# Patient Record
Sex: FEMALE | Race: WHITE | NOT HISPANIC OR LATINO | URBAN - METROPOLITAN AREA
[De-identification: names, ages, dates, MRNs, and addresses within clinical notes are randomized per-mention and may not be internally consistent; named-entity substitution may affect disease eponyms.]

---

## 2020-02-04 PROBLEM — G40.909 SEIZURE DISORDER: Status: ACTIVE | Noted: 2020-02-04

## 2020-06-26 PROBLEM — R56.9 SEIZURES: Status: ACTIVE | Noted: 2020-06-26

## 2021-05-12 ENCOUNTER — PATIENT MESSAGE (OUTPATIENT)
Dept: RESEARCH | Facility: HOSPITAL | Age: 31
End: 2021-05-12

## 2023-03-09 PROBLEM — G43.009 MIGRAINE WITHOUT AURA AND WITHOUT STATUS MIGRAINOSUS, NOT INTRACTABLE: Status: ACTIVE | Noted: 2023-03-09

## 2023-06-28 PROBLEM — G47.01 INSOMNIA DUE TO MEDICAL CONDITION: Status: ACTIVE | Noted: 2023-06-28

## 2024-06-28 ENCOUNTER — NURSE TRIAGE (OUTPATIENT)
Dept: ADMINISTRATIVE | Facility: CLINIC | Age: 34
End: 2024-06-28

## 2024-06-28 ENCOUNTER — ON-DEMAND VIRTUAL (OUTPATIENT)
Dept: URGENT CARE | Facility: CLINIC | Age: 34
End: 2024-06-28

## 2024-06-28 DIAGNOSIS — G40.909 SEIZURE DISORDER: ICD-10-CM

## 2024-06-28 DIAGNOSIS — Z76.0 MEDICATION REFILL: Primary | ICD-10-CM

## 2024-06-28 RX ORDER — LAMOTRIGINE 300 MG/1
300 TABLET, EXTENDED RELEASE ORAL DAILY
Qty: 30 TABLET | Refills: 0 | Status: SHIPPED | OUTPATIENT
Start: 2024-06-28 | End: 2024-07-28

## 2024-06-28 NOTE — PROGRESS NOTES
Subjective:      Patient ID: Lina Rutledge is a 33 y.o. female.    Vitals:  vitals were not taken for this visit.     Chief Complaint: Medication Refill      Visit Type: TELE AUDIOVISUAL    Present with the patient at the time of consultation: TELEMED PRESENT WITH PATIENT: None, at home    Past Medical History:   Diagnosis Date    Seizures      Past Surgical History:   Procedure Laterality Date    APPENDECTOMY       SECTION      x 2    CHOLECYSTECTOMY      ENDOMETRIAL ABLATION      HYSTERECTOMY      SINUS SURGERY       Review of patient's allergies indicates:   Allergen Reactions    Cefprozil      Current Outpatient Medications on File Prior to Visit   Medication Sig Dispense Refill    diazePAM (VALTOCO) 15 mg/2 spray (7.5/0.1mL x 2) Spry SPRAY 1 SPRAY IN THE NOSE AS NEEDED SEIZURE 4 each 3    lacosamide (VIMPAT) 200 mg Tab tablet TAKE 1 TABLET(200 MG) BY MOUTH EVERY 12 HOURS 60 tablet 5    rizatriptan (MAXALT-MLT) 10 MG disintegrating tablet Take 1 tablet (10 mg total) by mouth as needed for Migraine. May repeat in 2 hours if needed. Do not take more than 30mg in 24 hours 12 tablet 6    traZODone (DESYREL) 50 MG tablet Take 1 tablet (50 mg total) by mouth nightly as needed for Insomnia. 30 tablet 6    [DISCONTINUED] lamotrigine XR (LAMICTAL XR) 300 mg XR tablet Take 1 tablet (300 mg total) by mouth once daily. 30 tablet 11     No current facility-administered medications on file prior to visit.     Family History   Problem Relation Name Age of Onset    Cancer Maternal Grandfather      Diabetes Paternal Grandmother         Medications Ordered                [id number] - [address] AT [facility] [address] [address]    Telephone: [phone]   Fax: [phone]   Hours: Not open 24 hours                         E-Prescribed (1 of 1)              lamotrigine XR (LAMICTAL XR) 300 mg XR tablet    Sig: Take 1 tablet (300 mg total) by mouth  once daily.       Start: 6/28/24     Quantity: 30 tablet Refills: 0                           [facility] Peq Odvv Intake    6/28/2024  6:44 PM CDT - Filed by Patient   What is your current physical address in the event of a medical emergency? [address] [address], [place]   Are you able to take your vital signs? No   Please attach any relevant images or files          Hx of SZ disorder. Out of Lamictal. Unable to get a hold of PCP for refill. No acute issues or concerns. Requesting refill only.    Medication Refill        Neurological:  Negative for seizures.        Objective:   The physical exam was conducted virtually.  Physical Exam   Constitutional: She is oriented to person, place, and time. She does not appear ill. No distress.   HENT:   Head: Normocephalic and atraumatic.   Nose: Nose normal.   Eyes: Extraocular movement intact   Pulmonary/Chest: Effort normal.   Abdominal: Normal appearance.   Musculoskeletal: Normal range of motion.         General: Normal range of motion.   Neurological: no focal deficit. She is alert and oriented to person, place, and time.   Psychiatric: Her behavior is normal. Mood normal.   Vitals reviewed.      Assessment:     1. Medication refill    2. Seizure disorder        Plan:   Follow-up with Primary care for future refills.    Patient encouraged to monitor symptoms closely and instructed to follow-up for new or worsening symptoms. Further, in-person, evaluation may be necessary for continued treatment. Please follow up with your primary care doctor or specialist as needed. Verbally discussed plan. Patient confirms understanding and is in agreement with treatment and plan.     You must understand that you've received a Virtual Care evaluation only and that you may be released before all your medical problems are known or treated. You, the patient, will arrange for follow up care as instructed.    Medication refill  -     lamotrigine XR (LAMICTAL XR) 300 mg XR tablet; Take 1 tablet (300  mg total) by mouth once daily.  Dispense: 30 tablet; Refill: 0    Seizure disorder  -     lamotrigine XR (LAMICTAL XR) 300 mg XR tablet; Take 1 tablet (300 mg total) by mouth once daily.  Dispense: 30 tablet; Refill: 0

## 2024-06-28 NOTE — TELEPHONE ENCOUNTER
Pt calling back about Lamictal rx. Pt left message last two days & just notified by answering service at neuro clinic for Dr. Costa that no one in clinic today. Pt is out & needs refill called in.     Per Dr. Mejia, on call-spoke with  & Dr. Costa can refill. Pt updated, advised in the event she does not receive update that rx sent over in next hr before clinic closes, alternative ODVV option available. Pt vu.     Reason for Disposition   Prescription refill request for ESSENTIAL medicine (i.e., likelihood of harm to patient if not taken) and triager unable to refill per department policy    Protocols used: Medication Refill and Renewal Call-A-OH

## 2024-06-28 NOTE — TELEPHONE ENCOUNTER
Reason for Disposition   Message left on identified voicemail    Protocols used: No Contact or Duplicate Contact Call-A-OH  Called patient twice and went to identified voicemail both times. Left 2 messages with phone number to call back. No contact made with patient by this nurse.

## 2024-06-28 NOTE — TELEPHONE ENCOUNTER
Patient called to request a refill of her prescription for Lamotrigine XR/Lamictal  mg, oral daily. Patient states she had her last dose on last evening, 24, and will need her dose for today. Patient is also requesting that her prescription be went to [facility] , [address] [place] [address]. Patient's prescription is  at this time and her follow up visit is scheduled for 24 @ 3:30 PM. Patient states she is asymptomatic.     Patient advised that a message will be sent to her ordering Provider, LICHA Montgomery for assistance with refill request. Secure Chat Message also sent to LICHA Montgomery. Patient also advised to contact the [facility] on Call Triage Service for any additional questions/symptoms. Patient states understanding of care advice.     Reason for Disposition   Prescription refill request for ESSENTIAL medicine (i.e., likelihood of harm to patient if not taken) and triager unable to refill per department policy    Additional Information   Negative: New-onset or worsening symptoms, see that protocol (e.g., diarrhea, runny nose, sore throat)   Negative: Medicine question not related to refill or renewal   Negative: Caller (e.g., patient or pharmacist) requesting information about a new medicine   Negative: Caller requesting information unrelated to medicine    Protocols used: Medication Refill and Renewal Call-A-OH

## 2024-07-29 ENCOUNTER — ON-DEMAND VIRTUAL (OUTPATIENT)
Dept: URGENT CARE | Facility: CLINIC | Age: 34
End: 2024-07-29
Payer: MEDICARE

## 2024-07-29 DIAGNOSIS — Z76.0 MEDICATION REFILL: Primary | ICD-10-CM

## 2024-07-29 PROCEDURE — 99499 UNLISTED E&M SERVICE: CPT | Mod: ,,,

## 2024-07-30 ENCOUNTER — OFFICE VISIT (OUTPATIENT)
Dept: URGENT CARE | Facility: CLINIC | Age: 34
End: 2024-07-30
Payer: MEDICARE

## 2024-07-30 ENCOUNTER — NURSE TRIAGE (OUTPATIENT)
Dept: ADMINISTRATIVE | Facility: CLINIC | Age: 34
End: 2024-07-30
Payer: MEDICARE

## 2024-07-30 VITALS
TEMPERATURE: 98 F | BODY MASS INDEX: 26.5 KG/M2 | OXYGEN SATURATION: 99 % | HEIGHT: 62 IN | DIASTOLIC BLOOD PRESSURE: 74 MMHG | RESPIRATION RATE: 18 BRPM | WEIGHT: 144 LBS | SYSTOLIC BLOOD PRESSURE: 122 MMHG | HEART RATE: 82 BPM

## 2024-07-30 DIAGNOSIS — Z76.0 ENCOUNTER FOR MEDICATION REFILL: Primary | ICD-10-CM

## 2024-07-30 PROCEDURE — 99213 OFFICE O/P EST LOW 20 MIN: CPT | Mod: S$GLB,,,

## 2024-07-30 RX ORDER — LAMOTRIGINE 100 MG/1
300 TABLET ORAL DAILY
Qty: 90 TABLET | Refills: 0 | Status: SHIPPED | OUTPATIENT
Start: 2024-07-30 | End: 2024-07-31

## 2024-07-30 NOTE — TELEPHONE ENCOUNTER
Pt has med refill issue. Trying to get vimpat and lamictal for seizures. Pt is completely out of lamictal, has a couple vimpat left. Spoke with md office today and advised they would not be able to refill meds as pt has not been seen in over a year. Pt states she has already used her one VV per year for obtaining a med refill.     Dispo- advised pt I would not be able to reach out to an OCP despite her meds being essential as she has already been told by provider that they cannot provide a refill due to her not being seen recently. Advised pt as response to her question about if UC would provide refill, that I am not certain but if she decides to go to UC she should [facility] facility that can review her meds in the case they are able to give her refills. She VU.   Reason for Disposition   Caller with prescription and triager answers question    Protocols used: Medication Refill and Renewal Call-A-AH

## 2024-07-30 NOTE — PROGRESS NOTES
"Subjective:      Patient ID: Lina Rutledge is a 33 y.o. female.    Vitals:  height is 5' 2" (1.575 m) and weight is 65.3 kg (144 lb). Her oral temperature is 98.2 °F (36.8 °C). Her blood pressure is 122/74 and her pulse is 82. Her respiration is 18 and oxygen saturation is 99%.     Chief Complaint: Medication Refill    32 yo female Patient is requesting a medication refill for seizures.  States she has been trying to reach her old provider for the last 2 months because she moved 5 hours away, and needed to use virtual visits. States she requested pharmacy change but was unable to get filled due to not having recent provider visit in person.  Denies symptoms today including headache, vision changes, recent seizures. Is dealing with coccyx fracture from recent injury which she is seeing a surgeon for by the end of next week. Patient states she has a new PCP lined up but earliest available appointment was in November, "and I can't be off my seizure medication for months." Allergic to cefprozil.     Medication Refill  This is a new problem. The current episode started today. The problem occurs constantly. The problem has been unchanged. Pertinent negatives include no abdominal pain, anorexia, arthralgias, change in bowel habit, chest pain, chills, congestion, coughing, diaphoresis, fatigue, fever, headaches, joint swelling, myalgias, nausea, neck pain, numbness, rash, sore throat, swollen glands, urinary symptoms, vertigo, visual change, vomiting or weakness. Nothing aggravates the symptoms. She has tried nothing for the symptoms. The treatment provided no relief.       Constitution: Negative for chills, sweating, fatigue and fever.   HENT:  Negative for congestion and sore throat.    Neck: Negative for neck pain.   Cardiovascular:  Negative for chest pain.   Eyes:  Negative for double vision and blurred vision.   Respiratory:  Negative for cough.    Gastrointestinal:  Negative for abdominal pain, nausea and vomiting. "   Musculoskeletal:  Negative for joint pain, joint swelling and muscle ache.   Skin:  Negative for rash.   Neurological:  Negative for history of vertigo, speech difficulty, headaches, numbness and seizures (controlled with medications).      Objective:     Vitals:    07/30/24 1839   BP: 122/74   Pulse: 82   Resp: 18   Temp: 98.2 °F (36.8 °C)       Physical Exam   Constitutional: She is oriented to person, place, and time. She appears well-developed.  Non-toxic appearance. She does not appear ill. No distress.   HENT:   Head: Normocephalic and atraumatic.   Ears:   Right Ear: Hearing and external ear normal.   Left Ear: Hearing, external ear and ear canal normal.   Nose: Nose normal. No mucosal edema, rhinorrhea or nasal deformity. No epistaxis. Right sinus exhibits no maxillary sinus tenderness and no frontal sinus tenderness. Left sinus exhibits no maxillary sinus tenderness and no frontal sinus tenderness.   Mouth/Throat: Uvula is midline, oropharynx is clear and moist and mucous membranes are normal. No trismus in the jaw. Normal dentition. No uvula swelling. No posterior oropharyngeal erythema.   Eyes: Conjunctivae, EOM and lids are normal. Pupils are equal, round, and reactive to light. Right eye exhibits no discharge. Left eye exhibits no discharge.   Neck: Trachea normal and phonation normal. Neck supple. No neck rigidity present.   Cardiovascular: Normal rate, regular rhythm, normal heart sounds and normal pulses.   Pulmonary/Chest: Effort normal and breath sounds normal. No stridor. No respiratory distress. She has no wheezes. She has no rhonchi. She has no rales.   Abdominal: Normal appearance.   Musculoskeletal: Normal range of motion.         General: No deformity. Normal range of motion.   Neurological: no focal deficit. She is alert and oriented to person, place, and time. She has intact cranial nerves (2-12). She displays facial symmetry. No cranial nerve deficit. She exhibits normal muscle tone.  Gait normal. Coordination and gait normal.   Skin: Skin is warm, dry, intact, not diaphoretic, not pale and no rash.   Psychiatric: Her speech is normal and behavior is normal. Judgment and thought content normal.   Nursing note and vitals reviewed.      Assessment:     1. Encounter for medication refill        Plan:       Encounter for medication refill  -     lamoTRIgine (LAMICTAL) 100 MG tablet; Take 3 tablets (300 mg total) by mouth once daily.  Dispense: 90 tablet; Refill: 0        Patient Instructions   Please take medications as directed    Follow up with your PCP as indicated for future refills     If you have any side effects, do not stop medication abruptly - you must tape down    Go to the ER if you have repeating seizure episodes, chest pain, shortness of breath, heart palpitations      - You must understand that you have received an Urgent Care treatment only and that you may be released before all of your medical problems are known or treated.   - You, the patient, will arrange for follow up care as instructed with your primary care provider or recommended specialist.   - If your condition worsens or fails to improve we recommend that you receive another evaluation at the ER immediately or contact your PCP to discuss your concerns, or return here.   - Please do not drive or make any important decisions for 24 hours if you have received any pain medications, sedatives or mood altering drugs during your visit.    Disclaimer: This document was drafted with the use of a voice recognition device and is likely to have sound alike errors.

## 2024-07-31 NOTE — PATIENT INSTRUCTIONS
Please take medications as directed    Follow up with your PCP as indicated for future refills     If you have any side effects, do not stop medication abruptly - you must tape down    Go to the ER if you have repeating seizure episodes, chest pain, shortness of breath, heart palpitations      - You must understand that you have received an Urgent Care treatment only and that you may be released before all of your medical problems are known or treated.   - You, the patient, will arrange for follow up care as instructed with your primary care provider or recommended specialist.   - If your condition worsens or fails to improve we recommend that you receive another evaluation at the ER immediately or contact your PCP to discuss your concerns, or return here.   - Please do not drive or make any important decisions for 24 hours if you have received any pain medications, sedatives or mood altering drugs during your visit.    Disclaimer: This document was drafted with the use of a voice recognition device and is likely to have sound alike errors.

## 2024-11-04 ENCOUNTER — TELEPHONE (OUTPATIENT)
Dept: NEUROLOGY | Facility: CLINIC | Age: 34
End: 2024-11-04
Payer: MEDICARE

## 2024-11-04 ENCOUNTER — LAB VISIT (OUTPATIENT)
Dept: LAB | Facility: HOSPITAL | Age: 34
End: 2024-11-04
Attending: STUDENT IN AN ORGANIZED HEALTH CARE EDUCATION/TRAINING PROGRAM
Payer: MEDICARE

## 2024-11-04 ENCOUNTER — OFFICE VISIT (OUTPATIENT)
Dept: NEUROLOGY | Facility: CLINIC | Age: 34
End: 2024-11-04
Payer: MEDICARE

## 2024-11-04 VITALS
WEIGHT: 141.13 LBS | BODY MASS INDEX: 25.81 KG/M2 | SYSTOLIC BLOOD PRESSURE: 119 MMHG | DIASTOLIC BLOOD PRESSURE: 77 MMHG | HEART RATE: 74 BPM

## 2024-11-04 DIAGNOSIS — G40.909 SEIZURE DISORDER: ICD-10-CM

## 2024-11-04 DIAGNOSIS — G40.219 PARTIAL SYMPTOMATIC EPILEPSY WITH COMPLEX PARTIAL SEIZURES, INTRACTABLE, WITHOUT STATUS EPILEPTICUS: ICD-10-CM

## 2024-11-04 DIAGNOSIS — G40.219 PARTIAL SYMPTOMATIC EPILEPSY WITH COMPLEX PARTIAL SEIZURES, INTRACTABLE, WITHOUT STATUS EPILEPTICUS: Primary | ICD-10-CM

## 2024-11-04 DIAGNOSIS — G43.009 MIGRAINE WITHOUT AURA AND WITHOUT STATUS MIGRAINOSUS, NOT INTRACTABLE: ICD-10-CM

## 2024-11-04 DIAGNOSIS — R56.9 CONVULSIONS, UNSPECIFIED CONVULSION TYPE: ICD-10-CM

## 2024-11-04 PROCEDURE — 80175 DRUG SCREEN QUAN LAMOTRIGINE: CPT | Performed by: STUDENT IN AN ORGANIZED HEALTH CARE EDUCATION/TRAINING PROGRAM

## 2024-11-04 PROCEDURE — 99999 PR PBB SHADOW E&M-EST. PATIENT-LVL III: CPT | Mod: PBBFAC,,, | Performed by: STUDENT IN AN ORGANIZED HEALTH CARE EDUCATION/TRAINING PROGRAM

## 2024-11-04 PROCEDURE — 36415 COLL VENOUS BLD VENIPUNCTURE: CPT | Performed by: STUDENT IN AN ORGANIZED HEALTH CARE EDUCATION/TRAINING PROGRAM

## 2024-11-04 PROCEDURE — 99213 OFFICE O/P EST LOW 20 MIN: CPT | Mod: PBBFAC | Performed by: STUDENT IN AN ORGANIZED HEALTH CARE EDUCATION/TRAINING PROGRAM

## 2024-11-04 RX ORDER — RIZATRIPTAN BENZOATE 5 MG/1
5 TABLET, ORALLY DISINTEGRATING ORAL
Qty: 30 TABLET | Refills: 2 | Status: SHIPPED | OUTPATIENT
Start: 2024-11-04 | End: 2024-12-04

## 2024-11-04 NOTE — TELEPHONE ENCOUNTER
EMU Scheduled 12/6/24, 11am. Aware an adult is required to accompany her for the duration including overnight. Aware she will be connected to lines to her head, chest, arm, have an IV placed; will not be able to leave the room until all equipment is removed at discharge. Instructions thoroughly discussed; mailed via [employer]. Denies any special needs.

## 2024-11-04 NOTE — PROGRESS NOTES
"[facility]  Clinic Note    Date of Service: 11/4/2024  Patient seen at the request of: No ref. provider found    Reason for Consultation  epilepsy    Assessment:  Lina Rutledge is a 34 y.o. 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  Patient presents with very frequent focal seizures that have been associated with progression to convulsions.      At least three different seizure semiologies noted.  For semiology is reported to be a visual strobing or sometimes squiggles in her vision.  These symptoms are sometimes preceded by a loud auditory aura.  Sometimes progress to convulsion.    Patient also describes an isolated burning smell and a third phenomenon in which she "crumbles to the ground".  Once, this third semiology led to an injury as she fell down the stairs.  Query as to whether it is not true atonia as the patient does not always injure herself during these events.    Seizures are usually associated with severe headache after their offset.    MRI brain in the past was significant for right mesial temporal sclerosis.  We will proceed with the surgical workup as the patient is drug-resistant.    Current medications:    - Lamictal 300mg ER   - Vimpat 200/200  Past medications:    - Fycompa (dizziness, agitated)   - Keppra (agitated)   - Depakote (not efficacious)    Surgical options were discussed in clinic today and patient +  are in agreement with pursuing pre-surgical workup.      Plan:    - Lamictal and Vimpat levels  - continue lamictal 300mg ER  - Vimpat 200/200  - Maxalt 5mg as needed for headaches after seizures    - neuropsychological testing  - PET  - fMRI  - routine EEG   - EMU phase I      - RTC in 2 months    SEIZURE/EVENT PRECAUTIONS INCLUDE:  NO DRIVING until approximately 3-6 months have passed " "WITHOUT a seizure.   If you have a seizure, you will need to wait another 3 months to be eligible to drive again.  Avoid bathing or swimming alone or unsupervised.  Avoid cooking over large ranges or open flames.    Avoid climbing up heights unsupervised.  Avoid operating heavy machinery.     SEIZURE SAFETY:  When an epileptic seizure occurs, the following should be done to prevent injuries:  Do not hold or tie the person down.  Do not place anything in the person's mouth or try to force the teeth apart. The person is not in danger of swallowing his tongue.  Do not pour any liquid into the persons mouth or offer food or medicines until he is fully awake.  If possible, turn the person on his side during the seizure.  Place something soft under the person's head, loosen tight clothing, and clear the area of sharp or hard objects.  Stay with the person until the seizure ends. Let the person rest until he is fully awake.  Use a watch to time how long the seizure lasts.   Watch the type of movement and position of the person's head or eyes during the seizure.      A dictation device was used to produce this document. Use of such devices sometimes results in grammatical errors or replacement of words that sound similarly.     Signed:    Prashanth Elam MD  Neurology/Epilepsy  11/04/2024 7:53 AM      HPI:  Lina Rutledge is a 34 y.o. female with   Past Medical History:   Diagnosis Date    Seizures      MRI shows right MTS.    Patient reports one EMU visit in [place] with no seizure captured.      Significant decline in short term memory noted over the last 6 years.    First seizure: 6 months old    Semiology:   1st type:   Per mother she turns her head and stares off. Her right hand starts tapping. Sometimes she starts tapping just before she starts staring off. Sees strobe type lights and "squiggles" in her vision prior to and after the event. Can progress to convulsion.  - Aura: Hearing a buzzing or extremely loud " "train noise  - Triggers: car headlights at night  - Frequency: twice per month  - Duration: ~30 seconds    2nd type:   electrical burning smell  - Triggers: car headlights at night  - Frequency: once per month  - Duration: ~30 seconds    3rd type:   Crumbles to the ground  Has been associated with injury (tailbone fracture)  - Frequency: once per week    Status epilepticus: none    Seizure Risk Factors:   Birth history: 37 weeks   Developmental history: none  Febrile seizure: yes at 6 months   CNS infection: none  Head trauma: none  Family history: none    Previous Anti-Epileptic Medication:    Current medications:    - Lamictal 300mg ER   - Vimpat 200/200  Past medications:    - Fycompa (dizziness, agitated)   - Keppra (agitated)   - Depakote (not efficacious)  Driving: none      This is the extent of the patient's complaints at this time.    No results found for: "TSH", "V12", "JNBFSMIZ16", "HGBA1C"      Review of Systems:  ROS negative unless noted in HPI    Past Surgical History:  Past Surgical History:   Procedure Laterality Date    APPENDECTOMY       SECTION      x 2    CHOLECYSTECTOMY      ENDOMETRIAL ABLATION      HYSTERECTOMY      SINUS SURGERY         Family History:  Family History   Problem Relation Name Age of Onset    Cancer Maternal Grandfather      Diabetes Paternal Grandmother         Social History:  Social History     Tobacco Use    Smoking status: Every Day     Current packs/day: 0.25     Types: Cigarettes    Smokeless tobacco: Current   Substance Use Topics    Alcohol use: Not Currently    Drug use: Never       Allergies:  Cefprozil    Outpatient Medications:  Prior to Admission medications    Medication Sig Start Date End Date Taking? Authorizing Provider   diazePAM (VALTOCO) 15 mg/2 spray (7.5/0.1mL x 2) Spry SPRAY 1 SPRAY IN THE NOSE AS NEEDED SEIZURE 23   Joyce Costa APRN   lacosamide (VIMPAT) 200 mg Tab tablet Take 1 tablet (200 mg total) by mouth every 12 (twelve) hours. " 7/31/24   Joyce Costa APRN   lamotrigine XR (LAMICTAL XR) 300 mg XR tablet Take 1 tablet (300 mg total) by mouth once daily. 7/31/24 2/26/25  Julissa LICHA Cook       Physical exam:    Vitals: /77 (BP Location: Left arm, Patient Position: Sitting)   Pulse 74   Wt 64 kg (141 lb 1.5 oz)   LMP  (LMP Unknown)   BMI 25.81 kg/m²     General:   Sitting in chair, in no distress, well-nourished, well-developed, appears stated age.  Head/Neck:   Normocephalic,atraumatic  Pulm:  Non-labored breathing     Mental Status: Alert and oriented to person, time, place, situation. Speech spontaneous and fluent without paraphasias; no dysarthria  CN:  II: visual fields full  III, IV, VI: EOM intact without nystagmus or diplopia.   V: Sensation to light touch full and symmetric in V1-3. Masseter contraction full bilaterally.   VII: Facial movement full and symmetric.   VIII: Hearing grossly normal to conversation.  IX, X: Palate midline with symmetric elevation.    XI: SCM and trapezius: 5/5 bilaterally.   XII: Tongue midline without fasciculations.  Motor: Normal bulk and tone throughout all four extremities.   LUE: D: 5/5; B: 5/5; T:  5/5; WF: 5/5; WE:  5/5; IO: 5/5   RUE: D: 5/5; B: 5/5; T:  5/5; WF:5/5; WE:  5/5; IO: 5/5   LLE: HF: 5/5, KE: 5/5, KF: 5/5, DF: 5/5, PF: 5/5  RLE: HF: 5/5, KE: 5/5, KF: 5/5, DF: 5/5, PF: 5/5  No tremors   Sensory: Intact and symmetric to light touch throughout.  Reflexes: LUE: Biceps 2+ brachioradialis 2+  RUE: Biceps 2+, brachioradialis 2+  LLE: Knee 2+, ankle 2+  RLE: Knee 2+, ankle 2+  Coordination:  Intact and symmetric to finger-to-nose and heel-to-shin.  Gait:  Intact to casual gait.    Imaging:  All pertinent imaging was personally reviewed.    On my personal review:   Agree with MRI brain read of right MTS      Results for orders placed during the hospital encounter of 05/28/20    MRI Brain W WO Contrast    Narrative  EXAMINATION:  MRI BRAIN W WO CONTRAST    CLINICAL  HISTORY:  Seizures new or progressive;. Unspecified convulsions    COMPARISON:  None.    FINDINGS:  No mass, mass effect or hemorrhage can be seen in the brain.  Decreased volume of the right hippocampus.  Increased intensity in FLAIR sequence in the right CNA.  Mild dilation of the few jaspreet horn of the right lateral ventricle.    Impression  Findings mostly consistent with right mesial temporal sclerosis with decreased volume of the CNA, loss of anatomical features and hyperintensity in FLAIR sequence indicating sclerotic changes.      Electronically signed by: Robert Whitmore  Date:    05/28/2020  Time:    13:07      I spent a total of 58 minutes on the day of the visit. This includes face to face time and non-face to face time preparing to see the patient (eg, review of tests), obtaining and/or reviewing separately obtained history, documenting clinical information in the electronic or other health record, independently interpreting results and communicating results to the patient/family/caregiver, or care coordinator.

## 2024-11-06 LAB — LACOSAMIDE: 4.4 MCG/ML (ref 1–10)

## 2024-11-07 LAB — LAMOTRIGINE SERPL-MCNC: 6.5 UG/ML (ref 2–15)

## 2024-11-19 ENCOUNTER — TELEPHONE (OUTPATIENT)
Dept: NEUROLOGY | Facility: CLINIC | Age: 34
End: 2024-11-19
Payer: MEDICARE

## 2024-11-20 ENCOUNTER — PATIENT MESSAGE (OUTPATIENT)
Dept: NEUROLOGY | Facility: CLINIC | Age: 34
End: 2024-11-20
Payer: MEDICARE

## 2024-11-26 ENCOUNTER — HOSPITAL ENCOUNTER (OUTPATIENT)
Dept: RADIOLOGY | Facility: HOSPITAL | Age: 34
Discharge: HOME | End: 2024-11-26
Attending: STUDENT IN AN ORGANIZED HEALTH CARE EDUCATION/TRAINING PROGRAM
Payer: MEDICARE

## 2024-11-26 DIAGNOSIS — G40.219 PARTIAL SYMPTOMATIC EPILEPSY WITH COMPLEX PARTIAL SEIZURES, INTRACTABLE, WITHOUT STATUS EPILEPTICUS: ICD-10-CM

## 2024-11-26 PROCEDURE — 96020 FUNCTIONAL BRAIN MAPPING: CPT | Mod: TC

## 2024-12-05 ENCOUNTER — HOSPITAL ENCOUNTER (OUTPATIENT)
Dept: NEUROLOGY | Facility: CLINIC | Age: 34
Discharge: HOME | End: 2024-12-05
Payer: MEDICARE

## 2024-12-05 DIAGNOSIS — G40.219 PARTIAL SYMPTOMATIC EPILEPSY WITH COMPLEX PARTIAL SEIZURES, INTRACTABLE, WITHOUT STATUS EPILEPTICUS: ICD-10-CM

## 2024-12-05 PROCEDURE — 95819 EEG AWAKE AND ASLEEP: CPT | Mod: PBBFAC | Performed by: PSYCHIATRY & NEUROLOGY

## 2024-12-05 NOTE — PROCEDURES
Date of service  12/05/2024    Introduction  Electroencephalographic (EEG) recording is performed with electrodes placed according to the International 10-20 placement system. Thirty two (32) channels of digital signal (sampling rate of 512/sec) including T1 and T2 was simultaneously recorded from the scalp and may include EKG, EMG, and/or eye monitors. Recording band pass was 0.1 to 512 Hz. Digital video recording of the patient is simultaneously recorded with the EEG. The patient is instructed to report clinical symptoms which may occur during the recording session. EEG and video recording is stored and archived in digital format. Activation procedures which include photic stimulation, hyperventilation and instructing patients to perform simple tasks are done in selected patients.    The EEG is displayed on a monitor screen and can be reviewed using different montages. Computer assisted analysis is employed to detect spike and electrographic seizure activity. The entire record is submitted for computer analysis. The entire recording is visually reviewed and, the times identified by computer analysis as being spikes or seizures are reviewed again.     Compressed spectral analysis (CSA) is also performed on the activity recorded from each individual channel. This is displayed as a power display of frequencies from 0 to 30 Hz over time. The CSA is reviewed looking for asymmetries in power between homologous areas of the scalp and then compared with the original EEG recording.     Findings  The patient's background consists of a posteriorly dominant 9 Hz alpha rhythm, which is well-formed, well-modulated, and abolishes with eye opening.  Anteriorly, the patient's background consists of predominantly beta range frequencies.    During the course of the recording, the patient is noted to be awake, and subsequently becomes drowsy.  Patient then fell asleep with normal, symmetry stage II sleep architecture seen.      Hyperventilation and photic stimulation did not produce any abnormal response.    There is no focal slowing.  There are no focal, lateralized, or epileptiform transients.  No electrographic seizures are seen.    EKG demonstrates regular rhythm.    Interpretation  This is a normal EEG during wakefulness and sleep. No potentially epileptiform activity was seen. Please be aware that a normal EEG does not exclude the possibility of an underlying seizure disorder.

## 2024-12-06 ENCOUNTER — HOSPITAL ENCOUNTER (INPATIENT)
Facility: HOSPITAL | Age: 34
LOS: 5 days | Discharge: HOME | DRG: 101 | End: 2024-12-11
Attending: STUDENT IN AN ORGANIZED HEALTH CARE EDUCATION/TRAINING PROGRAM | Admitting: STUDENT IN AN ORGANIZED HEALTH CARE EDUCATION/TRAINING PROGRAM
Payer: MEDICARE

## 2024-12-06 ENCOUNTER — HOSPITAL ENCOUNTER (OUTPATIENT)
Dept: RADIOLOGY | Facility: HOSPITAL | Age: 34
Discharge: HOME | End: 2024-12-06
Attending: STUDENT IN AN ORGANIZED HEALTH CARE EDUCATION/TRAINING PROGRAM
Payer: MEDICARE

## 2024-12-06 DIAGNOSIS — G40.919 REFRACTORY EPILEPSY: Primary | ICD-10-CM

## 2024-12-06 DIAGNOSIS — R56.9 SEIZURE-LIKE ACTIVITY: ICD-10-CM

## 2024-12-06 DIAGNOSIS — G40.219 PARTIAL SYMPTOMATIC EPILEPSY WITH COMPLEX PARTIAL SEIZURES, INTRACTABLE, WITHOUT STATUS EPILEPTICUS: ICD-10-CM

## 2024-12-06 DIAGNOSIS — R56.9 SEIZURE: ICD-10-CM

## 2024-12-06 LAB
ALBUMIN SERPL BCP-MCNC: 4.4 G/DL (ref 3.5–5.2)
ALP SERPL-CCNC: 79 U/L (ref 40–150)
ALT SERPL W/O P-5'-P-CCNC: 32 U/L (ref 10–44)
AMPHET+METHAMPHET UR QL: NEGATIVE
ANION GAP SERPL CALC-SCNC: 10 MMOL/L (ref 8–16)
AST SERPL-CCNC: 22 U/L (ref 10–40)
B-HCG UR QL: NEGATIVE
BARBITURATES UR QL SCN>200 NG/ML: NEGATIVE
BASOPHILS # BLD AUTO: 0.04 K/UL (ref 0–0.2)
BASOPHILS NFR BLD: 0.4 % (ref 0–1.9)
BENZODIAZ UR QL SCN>200 NG/ML: NEGATIVE
BILIRUB SERPL-MCNC: 0.5 MG/DL (ref 0.1–1)
BUN SERPL-MCNC: 15 MG/DL (ref 6–20)
BZE UR QL SCN: NEGATIVE
CALCIUM SERPL-MCNC: 9.6 MG/DL (ref 8.7–10.5)
CANNABINOIDS UR QL SCN: NEGATIVE
CHLORIDE SERPL-SCNC: 106 MMOL/L (ref 95–110)
CO2 SERPL-SCNC: 25 MMOL/L (ref 23–29)
CREAT SERPL-MCNC: 0.9 MG/DL (ref 0.5–1.4)
CREAT UR-MCNC: 90 MG/DL (ref 15–325)
DIFFERENTIAL METHOD BLD: ABNORMAL
EOSINOPHIL # BLD AUTO: 0.1 K/UL (ref 0–0.5)
EOSINOPHIL NFR BLD: 1.3 % (ref 0–8)
ERYTHROCYTE [DISTWIDTH] IN BLOOD BY AUTOMATED COUNT: 12 % (ref 11.5–14.5)
EST. GFR  (NO RACE VARIABLE): >60 ML/MIN/1.73 M^2
GLUCOSE SERPL-MCNC: 91 MG/DL (ref 70–110)
HCT VFR BLD AUTO: 40.4 % (ref 37–48.5)
HGB BLD-MCNC: 14.4 G/DL (ref 12–16)
IMM GRANULOCYTES # BLD AUTO: 0.02 K/UL (ref 0–0.04)
IMM GRANULOCYTES NFR BLD AUTO: 0.2 % (ref 0–0.5)
LYMPHOCYTES # BLD AUTO: 2.4 K/UL (ref 1–4.8)
LYMPHOCYTES NFR BLD: 24.7 % (ref 18–48)
MCH RBC QN AUTO: 33.3 PG (ref 27–31)
MCHC RBC AUTO-ENTMCNC: 35.6 G/DL (ref 32–36)
MCV RBC AUTO: 93 FL (ref 82–98)
METHADONE UR QL SCN>300 NG/ML: NEGATIVE
MONOCYTES # BLD AUTO: 0.7 K/UL (ref 0.3–1)
MONOCYTES NFR BLD: 6.9 % (ref 4–15)
NEUTROPHILS # BLD AUTO: 6.6 K/UL (ref 1.8–7.7)
NEUTROPHILS NFR BLD: 66.5 % (ref 38–73)
NRBC BLD-RTO: 0 /100 WBC
OPIATES UR QL SCN: NEGATIVE
PCP UR QL SCN>25 NG/ML: NEGATIVE
PLATELET # BLD AUTO: 218 K/UL (ref 150–450)
PMV BLD AUTO: 10.8 FL (ref 9.2–12.9)
POCT GLUCOSE: 91 MG/DL (ref 70–110)
POTASSIUM SERPL-SCNC: 3.9 MMOL/L (ref 3.5–5.1)
PROT SERPL-MCNC: 7 G/DL (ref 6–8.4)
RBC # BLD AUTO: 4.33 M/UL (ref 4–5.4)
SODIUM SERPL-SCNC: 141 MMOL/L (ref 136–145)
TOXICOLOGY INFORMATION: NORMAL
WBC # BLD AUTO: 9.89 K/UL (ref 3.9–12.7)

## 2024-12-06 PROCEDURE — 78608 BRAIN IMAGING (PET): CPT | Mod: TC

## 2024-12-06 PROCEDURE — 80053 COMPREHEN METABOLIC PANEL: CPT

## 2024-12-06 PROCEDURE — 80175 DRUG SCREEN QUAN LAMOTRIGINE: CPT

## 2024-12-06 PROCEDURE — A9552 F18 FDG: HCPCS | Performed by: STUDENT IN AN ORGANIZED HEALTH CARE EDUCATION/TRAINING PROGRAM

## 2024-12-06 PROCEDURE — 95714 VEEG EA 12-26 HR UNMNTR: CPT

## 2024-12-06 PROCEDURE — 85025 COMPLETE CBC W/AUTO DIFF WBC: CPT

## 2024-12-06 PROCEDURE — 36415 COLL VENOUS BLD VENIPUNCTURE: CPT

## 2024-12-06 PROCEDURE — 95720 EEG PHY/QHP EA INCR W/VEEG: CPT | Mod: ,,, | Performed by: PSYCHIATRY & NEUROLOGY

## 2024-12-06 PROCEDURE — A4216 STERILE WATER/SALINE, 10 ML: HCPCS

## 2024-12-06 PROCEDURE — 11000001 HC ACUTE MED/SURG PRIVATE ROOM

## 2024-12-06 PROCEDURE — 81025 URINE PREGNANCY TEST: CPT

## 2024-12-06 PROCEDURE — 80307 DRUG TEST PRSMV CHEM ANLYZR: CPT

## 2024-12-06 PROCEDURE — 80235 DRUG ASSAY LACOSAMIDE: CPT

## 2024-12-06 PROCEDURE — 99223 1ST HOSP IP/OBS HIGH 75: CPT | Mod: AI,,, | Performed by: STUDENT IN AN ORGANIZED HEALTH CARE EDUCATION/TRAINING PROGRAM

## 2024-12-06 PROCEDURE — 25000003 PHARM REV CODE 250

## 2024-12-06 RX ORDER — LORAZEPAM 2 MG/ML
2 INJECTION INTRAMUSCULAR EVERY 5 MIN PRN
Status: DISCONTINUED | OUTPATIENT
Start: 2024-12-06 | End: 2024-12-11 | Stop reason: HOSPADM

## 2024-12-06 RX ORDER — FLUDEOXYGLUCOSE F18 500 MCI/ML
9 INJECTION INTRAVENOUS
Status: COMPLETED | OUTPATIENT
Start: 2024-12-06 | End: 2024-12-06

## 2024-12-06 RX ORDER — SODIUM CHLORIDE 0.9 % (FLUSH) 0.9 %
10 SYRINGE (ML) INJECTION
Status: DISCONTINUED | OUTPATIENT
Start: 2024-12-06 | End: 2024-12-11 | Stop reason: HOSPADM

## 2024-12-06 RX ORDER — DOCUSATE SODIUM 100 MG/1
100 CAPSULE, LIQUID FILLED ORAL 2 TIMES DAILY PRN
Status: DISCONTINUED | OUTPATIENT
Start: 2024-12-06 | End: 2024-12-11 | Stop reason: HOSPADM

## 2024-12-06 RX ORDER — ACETAMINOPHEN 325 MG/1
650 TABLET ORAL EVERY 4 HOURS PRN
Status: DISCONTINUED | OUTPATIENT
Start: 2024-12-06 | End: 2024-12-11 | Stop reason: HOSPADM

## 2024-12-06 RX ORDER — LACOSAMIDE 100 MG/1
100 TABLET ORAL EVERY 12 HOURS
Status: DISCONTINUED | OUTPATIENT
Start: 2024-12-06 | End: 2024-12-07

## 2024-12-06 RX ADMIN — FLUDEOXYGLUCOSE F-18 8.6 MILLICURIE: 500 INJECTION INTRAVENOUS at 08:12

## 2024-12-06 RX ADMIN — LACOSAMIDE 100 MG: 100 TABLET, FILM COATED ORAL at 08:12

## 2024-12-06 RX ADMIN — Medication 10 ML: at 08:12

## 2024-12-06 NOTE — H&P
[facility] - Neurosurgery ([facility])  Neurology-Epilepsy  History & Physical    Patient Name: Lina Rutledge  MRN: [id number]   Admission Date: 12/6/2024  Code Status: Full Code   Attending Provider: Claudine Toledo*   Primary Care Physician: Dianelys, Primary Doctor  Principal Problem:Refractory epilepsy    Subjective:     Chief Complaint:  EMU     HPI:   Ms. Lina Rutledge is a 34 y.o. female with refractory epilepsy who was referred by Dr. Elam for event capture/characterization and surgical planning.     Seizure Type: unknown  Seizure Etiology: unknown - suspect R MTS  Home AEDs: Vimpat 200 mg BID, Lamictal  mg po qhs  Last AEDs Taken Prior to Admission: 12/5/2024 PM    The patient is accompanied by family who contribute to the history. This patient has three types of seizure as described below. The patient reports having seizures for years. The patient reports to have worse seizure control. The seizure frequency is variable. The last seizure was Tuesday evening (12/3/2024) . The patient does not report side effects from seizure medication.     Semiology:   1st type: tapping/picking her right hand then stares off; can progress to GTC  - Aura: Hearing a buzzing or extremely loud train noise; squiggles in vision; rising in stomach  - Triggers: car headlights at night  - Frequency: variable, increased in last few months  - Duration: ~30 seconds     2nd type:   electrical burning smell  - Triggers: car headlights at night  - Frequency: once per month  - Duration: ~30 seconds    Handedness: right  Seizure Onset Age: childhood  Seizure/ Epilepsy Risk Factors: childhood seizure  Birth/Developmental History: normal birth history and Normal developmental history  Seizure Triggers/ Provoking Features: stress and alcohol, abrupt temperature changes  Previous Seizure Medications: levetiracetam (Keppra, LEV), perampanel (Fycompa, FCP), and valproic acid (Depakote, VPA)  Recent Med Changes: None  Other Treatments:  None  Prior Episodes of Status: No  Psychiatric/Behavioral Comorbitidies: None documented  Surgical Candidacy: Undergoing eval    Prior Studies:  EEG :   3/27/2020 - This is a normal EEG for age in the awake and drowsy states.   2024 - This is a normal EEG during wakefulness and sleep. No potentially epileptiform activity was seen.   vEEG/ EMU evaluation:   2020-2020 in [place] - The patient had two events during last two nights described as a nightmare, followed by awakening with SOB (typical semiology). The patient was also sleep deprived for one night to exacerbate epileptiform activity. The EEG reports from the first event revealed no ictal events, epileptiform discharges or paroxysmal abnormalities.   2020-2020 in [place] -  No abnormal electrical activity was noted on EEG despite weaning lamotrigine to 100 mg once and one night of sleep deprivation.   MRI of brain:   2020 MRI w/wo - Findings mostly consistent with right mesial temporal sclerosis with decreased volume of the CNA, loss of anatomical features and hyperintensity in FLAIR sequence indicating sclerotic changes   2024 fMRI - Findings compatible with left hemisphere language dominance with frontotemporal concordance.   AED levels:  2024 lacosamide level 4.4; lamotrigine level 6.5  CT/CTA Scan:  None  PET Scan: 2024 - Minimal asymmetric hypometabolism within the right hippocampal region as compared to the left.   Neuropsychological Evaluation: None  DEXA Scan: None  Other studies: None    Past Medical History:   Diagnosis Date    Seizures        Past Surgical History:   Procedure Laterality Date    APPENDECTOMY       SECTION      x 2    CHOLECYSTECTOMY      ENDOMETRIAL ABLATION      HYSTERECTOMY      SINUS SURGERY         Review of patient's allergies indicates:   Allergen Reactions    Cefprozil        No current facility-administered medications on file prior to encounter.     Current  Outpatient Medications on File Prior to Encounter   Medication Sig    diazePAM (VALTOCO) 15 mg/2 spray (7.5/0.1mL x 2) Spry SPRAY 1 SPRAY IN THE NOSE AS NEEDED SEIZURE    lacosamide (VIMPAT) 200 mg Tab tablet Take 1 tablet (200 mg total) by mouth every 12 (twelve) hours.    lamotrigine XR (LAMICTAL XR) 300 mg XR tablet Take 1 tablet (300 mg total) by mouth once daily.    rizatriptan (MAXALT-MLT) 5 MG disintegrating tablet Take 1 tablet (5 mg total) by mouth as needed for Migraine. May repeat in 2 hours if needed     Continuous Infusions:    Family History       Problem Relation (Age of Onset)    Cancer Maternal Grandfather    Diabetes Paternal Grandmother          Tobacco Use    Smoking status: Every Day     Current packs/day: 0.25     Types: Cigarettes    Smokeless tobacco: Current   Substance and Sexual Activity    Alcohol use: Not Currently    Drug use: Never    Sexual activity: Not Currently     Review of Systems   Neurological:  Positive for seizures (last one on 12/3).     Objective:     Vital Signs (Most Recent):  Temp: 97.8 °F (36.6 °C) (12/06/24 1126)  Pulse: 80 (12/06/24 1126)  Resp: 18 (12/06/24 1126)  BP: (!) 115/53 (12/06/24 1126)  SpO2: 99 % (12/06/24 1126) Vital Signs (24h Range):  Temp:  [97.8 °F (36.6 °C)] 97.8 °F (36.6 °C)  Pulse:  [80] 80  Resp:  [18] 18  SpO2:  [99 %] 99 %  BP: (115)/(53) 115/53        There is no height or weight on file to calculate BMI.     Physical Exam  Vitals and nursing note reviewed.   Constitutional:       Appearance: Normal appearance. She is normal weight.   HENT:      Head: Normocephalic and atraumatic.   Pulmonary:      Effort: Pulmonary effort is normal. No respiratory distress.   Musculoskeletal:      Right lower leg: No edema.      Left lower leg: No edema.   Skin:     General: Skin is warm and dry.   Neurological:      General: No focal deficit present.      Mental Status: She is alert and oriented to person, place, and time. Mental status is at baseline.       "GCS: GCS eye subscore is 4. GCS verbal subscore is 5. GCS motor subscore is 6.      Cranial Nerves: Cranial nerves 2-12 are intact.      Sensory: Sensation is intact.      Motor: Motor function is intact.      Deep Tendon Reflexes:      Reflex Scores:       Bicep reflexes are 2+ on the right side and 2+ on the left side.       Patellar reflexes are 2+ on the right side and 2+ on the left side.           NEUROLOGICAL EXAMINATION:     MENTAL STATUS   Oriented to person, place, and time.     CRANIAL NERVES   Cranial nerves II through XII intact.     REFLEXES     Reflexes   Right biceps: 2+  Left biceps: 2+  Right patellar: 2+  Left patellar: 2+      Significant Labs: All pertinent lab results from the past 24 hours have been reviewed.    Significant Studies: I have reviewed and interpreted all pertinent imaging results/findings within the past 24 hours.    Assessment and Plan:     * Refractory epilepsy  Mesial temporal sclerosis     34 y.o. female with refractory epilepsy who was referred by Dr. Elam for event capture/characterization and surgical planning. Increased seizure frequency since June 2024. Typical seizure - aura with hearing a buzzing or extremely loud train noise, squiggles in vision, rising in stomach followed by "staring," occasionally progresses to GTC. Home meds Vimpat 200 mg BID and Lamictal  mg po qhs.     - Continuous vEEG   - Hold home Lamictal; decrease Vimpat to 100 mg BID  - AED levels  - Activation procedures per protocol- medication adjustments as above  - IV Ativan PRN for GTC greater than 5 min - please call epilepsy on call   - Seizure precautions, suction and oxygen at bedside  - Fall precautions, side rail padding in place  - Visi monitoring with continuous pulse oximetry   - Telemetry    Mesial temporal sclerosis  See "Refractory epilepsy"        VTE Risk Mitigation (From admission, onward)           Ordered     IP VTE LOW RISK PATIENT  Once         12/06/24 1133     Place " sequential compression device  Until discontinued         12/06/24 1133                    Burke Isidro MD  Neurology-Epilepsy  [facility] - Neurosurgery ([facility])

## 2024-12-06 NOTE — SUBJECTIVE & OBJECTIVE
Past Medical History:   Diagnosis Date    Seizures        Past Surgical History:   Procedure Laterality Date    APPENDECTOMY       SECTION      x 2    CHOLECYSTECTOMY      ENDOMETRIAL ABLATION      HYSTERECTOMY      SINUS SURGERY         Review of patient's allergies indicates:   Allergen Reactions    Cefprozil        No current facility-administered medications on file prior to encounter.     Current Outpatient Medications on File Prior to Encounter   Medication Sig    diazePAM (VALTOCO) 15 mg/2 spray (7.5/0.1mL x 2) Spry SPRAY 1 SPRAY IN THE NOSE AS NEEDED SEIZURE    lacosamide (VIMPAT) 200 mg Tab tablet Take 1 tablet (200 mg total) by mouth every 12 (twelve) hours.    lamotrigine XR (LAMICTAL XR) 300 mg XR tablet Take 1 tablet (300 mg total) by mouth once daily.    rizatriptan (MAXALT-MLT) 5 MG disintegrating tablet Take 1 tablet (5 mg total) by mouth as needed for Migraine. May repeat in 2 hours if needed     Continuous Infusions:    Family History       Problem Relation (Age of Onset)    Cancer Maternal Grandfather    Diabetes Paternal Grandmother          Tobacco Use    Smoking status: Every Day     Current packs/day: 0.25     Types: Cigarettes    Smokeless tobacco: Current   Substance and Sexual Activity    Alcohol use: Not Currently    Drug use: Never    Sexual activity: Not Currently     Review of Systems   Neurological:  Positive for seizures (last one on 12/3).     Objective:     Vital Signs (Most Recent):  Temp: 97.8 °F (36.6 °C) (24 1126)  Pulse: 80 (24 1126)  Resp: 18 (24 1126)  BP: (!) 115/53 (24 1126)  SpO2: 99 % (24 1126) Vital Signs (24h Range):  Temp:  [97.8 °F (36.6 °C)] 97.8 °F (36.6 °C)  Pulse:  [80] 80  Resp:  [18] 18  SpO2:  [99 %] 99 %  BP: (115)/(53) 115/53        There is no height or weight on file to calculate BMI.     Physical Exam  Vitals and nursing note reviewed.   Constitutional:       Appearance: Normal appearance. She is normal weight.   HENT:       Head: Normocephalic and atraumatic.   Pulmonary:      Effort: Pulmonary effort is normal. No respiratory distress.   Musculoskeletal:      Right lower leg: No edema.      Left lower leg: No edema.   Skin:     General: Skin is warm and dry.   Neurological:      General: No focal deficit present.      Mental Status: She is alert and oriented to person, place, and time. Mental status is at baseline.      GCS: GCS eye subscore is 4. GCS verbal subscore is 5. GCS motor subscore is 6.      Cranial Nerves: Cranial nerves 2-12 are intact.      Sensory: Sensation is intact.      Motor: Motor function is intact.      Deep Tendon Reflexes:      Reflex Scores:       Bicep reflexes are 2+ on the right side and 2+ on the left side.       Patellar reflexes are 2+ on the right side and 2+ on the left side.           NEUROLOGICAL EXAMINATION:     MENTAL STATUS   Oriented to person, place, and time.     CRANIAL NERVES   Cranial nerves II through XII intact.     REFLEXES     Reflexes   Right biceps: 2+  Left biceps: 2+  Right patellar: 2+  Left patellar: 2+      Significant Labs: All pertinent lab results from the past 24 hours have been reviewed.    Significant Studies: I have reviewed and interpreted all pertinent imaging results/findings within the past 24 hours.

## 2024-12-06 NOTE — ASSESSMENT & PLAN NOTE
"Mesial temporal sclerosis     34 y.o. female with refractory epilepsy who was referred by Dr. Elam for event capture/characterization and surgical planning. Increased seizure frequency since June 2024. Typical seizure - aura with hearing a buzzing or extremely loud train noise, squiggles in vision, rising in stomach followed by "staring," occasionally progresses to GTC. Home meds Vimpat 200 mg BID and Lamictal  mg po qhs.     - Continuous vEEG   - Hold home Lamictal; decrease Vimpat to 100 mg BID  - AED levels  - Activation procedures per protocol- medication adjustments as above  - IV Ativan PRN for GTC greater than 5 min - please call epilepsy on call   - Seizure precautions, suction and oxygen at bedside  - Fall precautions, side rail padding in place  - Visi monitoring with continuous pulse oximetry   - Telemetry  "

## 2024-12-06 NOTE — HPI
Ms. Lina Rutledge is a 34 y.o. female with refractory epilepsy who was referred by Dr. Elam for event capture/characterization and surgical planning.     Seizure Type: unknown  Seizure Etiology: unknown - suspect R MTS  Home AEDs: Vimpat 200 mg BID, Lamictal  mg po qhs  Last AEDs Taken Prior to Admission: 12/5/2024 PM    The patient is accompanied by family who contribute to the history. This patient has three types of seizure as described below. The patient reports having seizures for years. The patient reports to have worse seizure control. The seizure frequency is variable. The last seizure was Tuesday evening (12/3/2024) . The patient does not report side effects from seizure medication.     Semiology:   1st type: tapping/picking her right hand then stares off; can progress to GTC  - Aura: Hearing a buzzing or extremely loud train noise; squiggles in vision; rising in stomach  - Triggers: car headlights at night  - Frequency: variable, increased in last few months  - Duration: ~30 seconds     2nd type:   electrical burning smell  - Triggers: car headlights at night  - Frequency: once per month  - Duration: ~30 seconds    Handedness: right  Seizure Onset Age: childhood  Seizure/ Epilepsy Risk Factors: childhood seizure  Birth/Developmental History: normal birth history and Normal developmental history  Seizure Triggers/ Provoking Features: stress and alcohol, abrupt temperature changes  Previous Seizure Medications: levetiracetam (Keppra, LEV), perampanel (Fycompa, FCP), and valproic acid (Depakote, VPA)  Recent Med Changes: None  Other Treatments: None  Prior Episodes of Status: No  Psychiatric/Behavioral Comorbitidies: None documented  Surgical Candidacy: Undergoing eval    Prior Studies:  EEG :   3/27/2020 - This is a normal EEG for age in the awake and drowsy states.   12/5/2024 - This is a normal EEG during wakefulness and sleep. No potentially epileptiform activity was seen.   vEEG/ EMU  evaluation:   6/29/2020-7/1/2020 in [place] - The patient had two events during last two nights described as a nightmare, followed by awakening with SOB (typical semiology). The patient was also sleep deprived for one night to exacerbate epileptiform activity. The EEG reports from the first event revealed no ictal events, epileptiform discharges or paroxysmal abnormalities.   9/25/2020-9/30/2020 in [place] -  No abnormal electrical activity was noted on EEG despite weaning lamotrigine to 100 mg once and one night of sleep deprivation.   MRI of brain:   5/28/2020 MRI w/wo - Findings mostly consistent with right mesial temporal sclerosis with decreased volume of the CNA, loss of anatomical features and hyperintensity in FLAIR sequence indicating sclerotic changes   11/26/2024 fMRI - Findings compatible with left hemisphere language dominance with frontotemporal concordance.   AED levels:  11/4/2024 lacosamide level 4.4; lamotrigine level 6.5  CT/CTA Scan:  None  PET Scan: 12/6/2024 - Minimal asymmetric hypometabolism within the right hippocampal region as compared to the left.   Neuropsychological Evaluation: None  DEXA Scan: None  Other studies: None

## 2024-12-07 ENCOUNTER — DOCUMENTATION ONLY (OUTPATIENT)
Dept: NEUROLOGY | Facility: CLINIC | Age: 34
End: 2024-12-07
Payer: MEDICARE

## 2024-12-07 PROBLEM — R56.9 CONVULSION, NON-EPILEPTIC: Status: ACTIVE | Noted: 2024-12-07

## 2024-12-07 PROCEDURE — 93005 ELECTROCARDIOGRAM TRACING: CPT

## 2024-12-07 PROCEDURE — 95714 VEEG EA 12-26 HR UNMNTR: CPT

## 2024-12-07 PROCEDURE — 11000001 HC ACUTE MED/SURG PRIVATE ROOM

## 2024-12-07 PROCEDURE — 95720 EEG PHY/QHP EA INCR W/VEEG: CPT | Mod: ,,, | Performed by: PSYCHIATRY & NEUROLOGY

## 2024-12-07 PROCEDURE — 99233 SBSQ HOSP IP/OBS HIGH 50: CPT | Mod: GC,,, | Performed by: PSYCHIATRY & NEUROLOGY

## 2024-12-07 PROCEDURE — A4216 STERILE WATER/SALINE, 10 ML: HCPCS

## 2024-12-07 PROCEDURE — 25000003 PHARM REV CODE 250

## 2024-12-07 PROCEDURE — 93010 ELECTROCARDIOGRAM REPORT: CPT | Mod: ,,, | Performed by: INTERNAL MEDICINE

## 2024-12-07 RX ORDER — SUMATRIPTAN SUCCINATE 25 MG/1
25 TABLET ORAL ONCE
Status: COMPLETED | OUTPATIENT
Start: 2024-12-07 | End: 2024-12-07

## 2024-12-07 RX ORDER — DIPHENHYDRAMINE HCL 50 MG
50 CAPSULE ORAL ONCE
Status: COMPLETED | OUTPATIENT
Start: 2024-12-08 | End: 2024-12-08

## 2024-12-07 RX ADMIN — ACETAMINOPHEN 650 MG: 325 TABLET ORAL at 10:12

## 2024-12-07 RX ADMIN — Medication 10 ML: at 09:12

## 2024-12-07 RX ADMIN — LACOSAMIDE 100 MG: 100 TABLET, FILM COATED ORAL at 09:12

## 2024-12-07 RX ADMIN — SUMATRIPTAN SUCCINATE 25 MG: 25 TABLET ORAL at 02:12

## 2024-12-07 NOTE — HOSPITAL COURSE
"12/6>12/7: Home Lamictal held at admission and Vimpat decreased to 100 mg BID. Patient had witnessed non-epileptic event this morning where she was repeating a phrase;  said this is one of her typical event types, there was no EEG correlate with this event. Plan for hold Vimpat beginning this evening and undergo sleep deprivation with meds overnight.   12/7>12/8: Successfully sleep deprived until 5:00AM. EEG normal without any further clinical events.   12/8>12/9: PB ~2026 for episode described as heart racing and "not feeling well" followed by crying, hyperventilating, tingling sensation in whole body, and "fidgeting" with clothing/blankets with impaired awareness with no EEG correlate. Neuropsychology evaluation today for nonepileptic events. EEG remains WNL. Continue with provoking measures to evaluate for concurrent epilepsy- repeat sleep deprivation tonight and activation medications 12/10 AM.  12/9>12/10: Successful sleep deprivation overnight, received activation medications this morning. EEG remains WNL. PB ~0808 for typical episode of staring associated with slurred speech, tingling sensation, and abdominal sensation with no EEG correlate. HV/PS today.  12/10>12/11: NAEON. EEG WNL throughout admission off AEDs with repeat provoking measures. Multiple typical events with no EEG correlate c/w conversion disorder. No evidence to suggest concurrent diagnosis of epilepsy. Discharged home in stable condition on resumed medications after discussion with patient's established neurologist, Dr. Elam. Recommend repeating MRI with Epilepsy Protocol to evaluate for MTS noted on previous MRI in 2020. Patient has scheduled follow up with Dr. Elam.    See EEG reports for details.  "

## 2024-12-07 NOTE — NURSING
EMU SEIZURE EVENT NOTE  Time event started:10:15  Duration: 2 minutes  Describe symptoms during event:crying both arms mildly shaking, repetitive speech  Post-ictal symptoms: tired and headache  Who notified: team at bedside     See flowsheets for vital signs.

## 2024-12-07 NOTE — PROGRESS NOTES
EEG Hook up  AM Check Electrodes had to be fixed.No    Skin Integrity: Normal     Carlos Vieira   12/07/2024 9:40 AM

## 2024-12-07 NOTE — PROGRESS NOTES
[facility] - Neurosurgery ([facility])  Neurology-Epilepsy  Progress Note    Patient Name: Lina Rutledge  MRN: [id number]  Admission Date: 12/6/2024  Hospital Length of Stay: 1 days  Code Status: Full Code   Attending Provider: Donya Rizzo MD  Primary Care Physician: Dianelys, Primary Doctor   Principal Problem:Refractory epilepsy    Subjective:     Hospital Course:   12/6>12/7: Home Lamictal held at admission and Vimpat decreased to 100 mg BID. Patient had witnessed non-epileptic event this morning where she was repeating a phrase;  said this is one of her typical event types, there was no EEG correlate with this event. Plan for hold Vimpat beginning this evening and undergo sleep deprivation with meds overnight.     Interval History: Patient with non-epileptic event this morning. Plan to hold Vimpat and undergo sleep deprivation.     Current Facility-Administered Medications   Medication Dose Route Frequency Provider Last Rate Last Admin    acetaminophen tablet 650 mg  650 mg Oral Q4H PRN Burke Isidro MD   650 mg at 12/07/24 1026    [START ON 12/8/2024] diphenhydrAMINE capsule 50 mg  50 mg Oral Once Burke Isidro MD        docusate sodium capsule 100 mg  100 mg Oral BID PRN Burke Isidro MD        LORazepam injection 2 mg  2 mg Intravenous Q5 Min PRN Burke Isidro MD        sodium chloride 0.9% flush 10 mL  10 mL Intravenous PRN Burke Isidro MD   10 mL at 12/06/24 2045    [START ON 12/8/2024] tramadol split tablet 25 mg  25 mg Oral Once Burke Isidro MD         Continuous Infusions:    Review of Systems   Neurological:         + non-epileptic event     Objective:     Vital Signs (Most Recent):  Temp: 98 °F (36.7 °C) (12/07/24 0739)  Pulse: 73 (12/07/24 0739)  Resp: 18 (12/07/24 0739)  BP: (!) 108/56 (12/07/24 0739)  SpO2: 97 % (12/07/24 0739) Vital Signs (24h Range):  Temp:  [97.4 °F (36.3 °C)-98 °F (36.7 °C)] 98 °F (36.7 °C)  Pulse:  [64-90] 73  Resp:  [17-20] 18  SpO2:  [97 %-100 %] 97 %  BP: ()/(49-62)  "108/56     Weight: 67.5 kg (148 lb 13 oz)  Body mass index is 27.22 kg/m².     Physical Exam  Vitals and nursing note reviewed.   Constitutional:       Appearance: Normal appearance. She is normal weight.   HENT:      Head: Normocephalic and atraumatic.   Pulmonary:      Effort: Pulmonary effort is normal. No respiratory distress.   Musculoskeletal:      Right lower leg: No edema.      Left lower leg: No edema.   Skin:     General: Skin is warm and dry.   Neurological:      General: No focal deficit present.      Mental Status: She is alert and oriented to person, place, and time. Mental status is at baseline.      GCS: GCS eye subscore is 4. GCS verbal subscore is 5. GCS motor subscore is 6.            NEUROLOGICAL EXAMINATION:     MENTAL STATUS   Oriented to person, place, and time.       Significant Labs: All pertinent lab results from the past 24 hours have been reviewed.    Significant Studies: EEG: I have reviewed all pertinent results/findings within the past 24 hours    Assessment and Plan:     * Refractory epilepsy  Mesial temporal sclerosis     34 y.o. female with refractory epilepsy who was referred by Dr. Elam for event capture/characterization and surgical planning. Increased seizure frequency since June 2024. Typical seizure - aura with hearing a buzzing or extremely loud train noise, squiggles in vision, rising in stomach followed by "staring," occasionally progresses to GTC. Home meds Vimpat 200 mg BID and Lamictal  mg po qhs.     - Continuous vEEG   - Hold home Lamictal since admission  - Hold Vimpat beginning 12/7 PM (was decreased to 100 mg BID at admission)  - AED levels pending   - Activation procedures per protocol- medication adjustments as above  - IV Ativan PRN for GTC greater than 5 min - please call epilepsy on call   - Seizure precautions, suction and oxygen at bedside  - Fall precautions, side rail padding in place  - Visi monitoring with continuous pulse oximetry   - " "Telemetry    Non-epileptic event  Patient had witnessed event morning of 12/7 where she repeated "I don't want to go there."  at bedside stated that it was consistent with one of her event types. There was no EEG correlate with this event.     Mesial temporal sclerosis  See "Refractory epilepsy"        VTE Risk Mitigation (From admission, onward)           Ordered     IP VTE LOW RISK PATIENT  Once         12/06/24 1133     Place sequential compression device  Until discontinued         12/06/24 1133                    Burke Isidro MD  Neurology-Epilepsy  [facility] - Neurosurgery ([facility])  "

## 2024-12-07 NOTE — SUBJECTIVE & OBJECTIVE
Interval History: Patient with non-epileptic event this morning. Plan to hold Vimpat and undergo sleep deprivation.     Current Facility-Administered Medications   Medication Dose Route Frequency Provider Last Rate Last Admin    acetaminophen tablet 650 mg  650 mg Oral Q4H PRN Burke Isidro MD   650 mg at 12/07/24 1026    [START ON 12/8/2024] diphenhydrAMINE capsule 50 mg  50 mg Oral Once Burke Isidro MD        docusate sodium capsule 100 mg  100 mg Oral BID PRN Burke Isidro MD        LORazepam injection 2 mg  2 mg Intravenous Q5 Min PRN Burke Isidro MD        sodium chloride 0.9% flush 10 mL  10 mL Intravenous PRN Burke Isidro MD   10 mL at 12/06/24 2045    [START ON 12/8/2024] tramadol split tablet 25 mg  25 mg Oral Once Burke Isidro MD         Continuous Infusions:    Review of Systems   Neurological:         + non-epileptic event     Objective:     Vital Signs (Most Recent):  Temp: 98 °F (36.7 °C) (12/07/24 0739)  Pulse: 73 (12/07/24 0739)  Resp: 18 (12/07/24 0739)  BP: (!) 108/56 (12/07/24 0739)  SpO2: 97 % (12/07/24 0739) Vital Signs (24h Range):  Temp:  [97.4 °F (36.3 °C)-98 °F (36.7 °C)] 98 °F (36.7 °C)  Pulse:  [64-90] 73  Resp:  [17-20] 18  SpO2:  [97 %-100 %] 97 %  BP: ()/(49-62) 108/56     Weight: 67.5 kg (148 lb 13 oz)  Body mass index is 27.22 kg/m².     Physical Exam  Vitals and nursing note reviewed.   Constitutional:       Appearance: Normal appearance. She is normal weight.   HENT:      Head: Normocephalic and atraumatic.   Pulmonary:      Effort: Pulmonary effort is normal. No respiratory distress.   Musculoskeletal:      Right lower leg: No edema.      Left lower leg: No edema.   Skin:     General: Skin is warm and dry.   Neurological:      General: No focal deficit present.      Mental Status: She is alert and oriented to person, place, and time. Mental status is at baseline.      GCS: GCS eye subscore is 4. GCS verbal subscore is 5. GCS motor subscore is 6.            NEUROLOGICAL  EXAMINATION:     MENTAL STATUS   Oriented to person, place, and time.       Significant Labs: All pertinent lab results from the past 24 hours have been reviewed.    Significant Studies: EEG: I have reviewed all pertinent results/findings within the past 24 hours

## 2024-12-07 NOTE — PLAN OF CARE
Problem: Adult Inpatient Plan of Care  Goal: Plan of Care Review  Outcome: Progressing  Goal: Optimal Comfort and Wellbeing  Outcome: Progressing  Goal: Readiness for Transition of Care  Outcome: Progressing     Problem: Fall Injury Risk  Goal: Absence of Fall and Fall-Related Injury  Outcome: Progressing     Problem: Seizure, Active Management  Goal: Absence of Seizure/Seizure-Related Injury  Outcome: Progressing

## 2024-12-07 NOTE — ASSESSMENT & PLAN NOTE
"Mesial temporal sclerosis     34 y.o. female with refractory epilepsy who was referred by Dr. Elam for event capture/characterization and surgical planning. Increased seizure frequency since June 2024. Typical seizure - aura with hearing a buzzing or extremely loud train noise, squiggles in vision, rising in stomach followed by "staring," occasionally progresses to GTC. Home meds Vimpat 200 mg BID and Lamictal  mg po qhs.     - Continuous vEEG   - Hold home Lamictal since admission  - Hold Vimpat beginning 12/7 PM (was decreased to 100 mg BID at admission)  - AED levels pending   - Activation procedures per protocol- medication adjustments as above  - IV Ativan PRN for GTC greater than 5 min - please call epilepsy on call   - Seizure precautions, suction and oxygen at bedside  - Fall precautions, side rail padding in place  - Visi monitoring with continuous pulse oximetry   - Telemetry  "

## 2024-12-07 NOTE — ASSESSMENT & PLAN NOTE
"Patient had witnessed event morning of 12/7 where she repeated "I don't want to go there."  at bedside stated that it was consistent with one of her event types. There was no EEG correlate with this event.   "

## 2024-12-07 NOTE — PLAN OF CARE
[facility] - Neurosurgery (Hospital)  Initial Discharge Assessment       Primary Care Provider: Dianelys, Primary Doctor    Admission Diagnosis: Partial symptomatic epilepsy with complex partial seizures, intractable, without status epilepticus [G40.219]    Admission Date: 12/6/2024  Expected Discharge Date:     Transition of Care Barriers: None    Payor: MEDICARE / Plan: MEDICARE PART A & B / Product Type: Government /     Extended Emergency Contact Information  Primary Emergency Contact: ABIGAIL BENJAMIN  Mobile Phone: [phone]  Relation: Mother  Preferred language: English   needed? No    Discharge Plan A: Home  Discharge Plan B: Home with family      [id number] - [place], [place] - [address] AT [facility] [address]  [address]  [place] [address]  Phone: [phone] Fax: [phone]      Initial Assessment (most recent)       Adult Discharge Assessment - 12/07/24 1036          Discharge Assessment    Assessment Type Discharge Planning Assessment     Confirmed/corrected address, phone number and insurance Yes     Confirmed Demographics Correct on Facesheet     Source of Information patient     Does patient/caregiver understand observation status Yes     Communicated STEWART with patient/caregiver Yes;Date not available/Unable to determine     Reason For Admission Refractory epilepsy     People in Home significant other     Do you expect to return to your current living situation? Yes     Do you have help at home or someone to help you manage your care at home? Yes     Prior to hospitilization cognitive status: Alert/Oriented     Current cognitive status: Alert/Oriented     Walking or Climbing Stairs Difficulty no     Dressing/Bathing Difficulty no     Equipment Currently Used at Home none     Readmission within 30 days? No     Patient currently being followed by outpatient case management? No     Do you currently have service(s) that help you manage your care at home? No     Do  you take prescription medications? Yes     Do you have prescription coverage? Yes     Coverage Payor: MEDICARE - MEDICARE PART A & B -     Do you have any problems affording any of your prescribed medications? No     Is the patient taking medications as prescribed? yes     How do you get to doctors appointments? family or friend will provide     Are you on dialysis? No     Do you take coumadin? No     Discharge Plan A Home     Discharge Plan B Home with family     DME Needed Upon Discharge  none     Discharge Plan discussed with: Patient     Transition of Care Barriers None                   Spoke to pt. Pt lives at home with her fichaitanya. Post hospital  stay patients fiance and mother will be pt support person and pt. has transportation at d/c with her family. There have been no hospitalizations within the last 30 days per pt\. Verified pt PCP and preferred pharmacy. Pt stated not on Coumadin and is not receiving dialysis. All questions answered regarding case management/ discharge planning , pt verbalized understanding. Discharge booklet with SW contact information given to pt.     Discharge Plan A and Plan B have been determined by review of patient's clinical status, future medical and therapeutic needs, and coverage/benefits for post-acute care in coordination with multidisciplinary team members.      DELTA Romano  [facility]   Ext: [phone]

## 2024-12-07 NOTE — PLAN OF CARE
Problem: Adult Inpatient Plan of Care  Goal: Plan of Care Review  Outcome: Progressing  Goal: Patient-Specific Goal (Individualized)  Outcome: Progressing  Goal: Absence of Hospital-Acquired Illness or Injury  Outcome: Progressing  Goal: Optimal Comfort and Wellbeing  Outcome: Progressing  Goal: Readiness for Transition of Care  Outcome: Progressing     Problem: Fall Injury Risk  Goal: Absence of Fall and Fall-Related Injury  Outcome: Progressing     Problem: Seizure, Active Management  Goal: Absence of Seizure/Seizure-Related Injury  Outcome: Progressing           POC reviewed and updated with the patient/. Questions regarding POC were encouraged and addressed with the patient.  VSS, see flow-sheets. Tele maintained per order.   ESTELLE. Patient is AO X 4 at this time. Continuous EEG in place. Fall/safety precautions maintained, no signs of injury noted during shift. Seizure precautions maintained. Upon exiting room, patient's bed locked in low position, side rails up x 3, bed alarm refused, with call light within reach. Instructed patient to call staff for assistance, verbalized understanding.  No acute signs of distress noted at this time.

## 2024-12-07 NOTE — PROCEDURES
EPILEPSY MONITORING UNIT VIDEO EEG REPORT    Date of service: 12/6/24 - 12/7/24  EEG Numbers: [id number]  Requesting Physician: Dr. Prashanth Elam    Introduction  Electroencephalographic (EEG) is recorded with electrodes placed   according to the International 10-20 placement system.  Thirty Two (32) channels   of digital signal, including T1 and T2 electrodes, are simultaneously recorded   from the scalp and may also include EKG, EMG and/or eye movement monitors.    Recording band pass was 0.1 to 512 Hz.  Digital video recording of the patient   is simultaneously recorded with the EEG.  The patient is instructed to report   clinical symptoms which may occur during the recording session.  EEG and video   recording are stored and archived in digital format.  Activation procedures,   which include photic stimulation, hyperventilation and instructing patients to   perform simple tasks, are done in selected patients.     The EEG is displayed on a monitor screen and can be reformatted into different   montages for evaluation.  The entire recoding is submitted for computer-assisted   analysis to detect spike and electrographic seizure activity.  The entire   recording is visually reviewed, and the times identified by computer analysis as   being spikes or seizures are reviewed again.     Compressed spectral analysis (CSA) is also performed on the activity recorded   from each individual channel.  This is displayed as a power display of   frequencies from 0 to 30 Hz over time.  The CSA analysis is done and displayed   continuously.  This is reviewed for asymmetries in power between homologous   areas of the scalp and for presence of changes in power which can be seen when   seizures occur.  Sections of suspected abnormalities on the CSA are then   compared with the original EEG recording.     Kovio software was also utilized in the review of this study.  This software   suite analyzes the EEG recording in multiple  domains.  Coherence and rhythmicity   are computed to identify EEG sections which may contain organized seizures.    Each channel undergoes analysis to detect presence of spike and sharp waves   which have special and morphological characteristics of epileptic activity.  The   routine EEG recording is converted from special into frequency domain.  This is   then displayed comparing homologous areas to identify areas of significant   asymmetry.  Algorithm to identify non-cortically generated artifact is used to   separate artifact from the EEG.    Recording Times  12/6/24 14:51 - 12/7/24 07:00  A total of 15 hours and 51 minutes was recorded.    Findings  BACKGROUND: The patient's background consists of a posteriorly dominant 9 Hz alpha rhythm.  Anteriorly, the patient's background consists of predominantly a mixture of beta and 5-7 Hz theta range frequencies. There is no focal slowing.      During the course of the recording, the patient is noted to be awake, and subsequently becomes drowsy, and falls asleep with normal, symmetric sleep architecture present.     Hyperventilation and photic stimulation were not performed.     INTERICTAL: None    ICTAL/CLINICAL EVENTS: None     EKG demonstrates regular rhythm.    Interpretation  This is a normal EEG during wakefulness and sleep. No potentially epileptiform discharges were seen.  Please be aware that a normal video EEG monitoring study that does not capture the events of interest cannot fully exclude the possibility of an underlying seizure disorder.

## 2024-12-08 LAB
OHS QRS DURATION: 78 MS
OHS QTC CALCULATION: 398 MS

## 2024-12-08 PROCEDURE — 11000001 HC ACUTE MED/SURG PRIVATE ROOM

## 2024-12-08 PROCEDURE — 99233 SBSQ HOSP IP/OBS HIGH 50: CPT | Mod: GC,,, | Performed by: PSYCHIATRY & NEUROLOGY

## 2024-12-08 PROCEDURE — 95720 EEG PHY/QHP EA INCR W/VEEG: CPT | Mod: ,,, | Performed by: PSYCHIATRY & NEUROLOGY

## 2024-12-08 PROCEDURE — 95714 VEEG EA 12-26 HR UNMNTR: CPT

## 2024-12-08 PROCEDURE — 25000003 PHARM REV CODE 250

## 2024-12-08 PROCEDURE — A4216 STERILE WATER/SALINE, 10 ML: HCPCS

## 2024-12-08 PROCEDURE — 93010 ELECTROCARDIOGRAM REPORT: CPT | Mod: ,,, | Performed by: INTERNAL MEDICINE

## 2024-12-08 PROCEDURE — 93005 ELECTROCARDIOGRAM TRACING: CPT

## 2024-12-08 RX ORDER — TALC
6 POWDER (GRAM) TOPICAL NIGHTLY PRN
Status: DISCONTINUED | OUTPATIENT
Start: 2024-12-08 | End: 2024-12-09

## 2024-12-08 RX ADMIN — Medication 10 ML: at 08:12

## 2024-12-08 RX ADMIN — DIPHENHYDRAMINE HYDROCHLORIDE 50 MG: 50 CAPSULE ORAL at 07:12

## 2024-12-08 RX ADMIN — TRAMADOL HYDROCHLORIDE 25 MG: 50 TABLET, COATED ORAL at 07:12

## 2024-12-08 NOTE — PROGRESS NOTES
[facility] Garcia - Neurosurgery ([facility])  Neurology-Epilepsy  Progress Note    Patient Name: Lina Rutledge  MRN: [id number]  Admission Date: 12/6/2024  Hospital Length of Stay: 2 days  Code Status: Full Code   Attending Provider: Donya Rizzo MD  Primary Care Physician: Dianelys, Primary Doctor   Principal Problem:Refractory epilepsy    Subjective:     Hospital Course:   12/6>12/7: Home Lamictal held at admission and Vimpat decreased to 100 mg BID. Patient had witnessed non-epileptic event this morning where she was repeating a phrase;  said this is one of her typical event types, there was no EEG correlate with this event. Plan for hold Vimpat beginning this evening and undergo sleep deprivation with meds overnight.   12/7>12/8: Successfully sleep deprived until 5:00AM. EEG normal without any further clinical events.     Interval History: EEG normal, no further clinical events.     Current Facility-Administered Medications   Medication Dose Route Frequency Provider Last Rate Last Admin    acetaminophen tablet 650 mg  650 mg Oral Q4H PRN Burke Isidro MD   650 mg at 12/07/24 1026    docusate sodium capsule 100 mg  100 mg Oral BID PRN Burke Isidro MD        LORazepam injection 2 mg  2 mg Intravenous Q5 Min PRN Burke Isidro MD        sodium chloride 0.9% flush 10 mL  10 mL Intravenous PRN Burke Isidro MD   10 mL at 12/07/24 2115     Continuous Infusions: None    Review of Systems   Psychiatric/Behavioral:  Positive for sleep disturbance.      Objective:     Vital Signs (Most Recent):  Temp: 98 °F (36.7 °C) (12/08/24 0806)  Pulse: 81 (12/08/24 0806)  Resp: 18 (12/08/24 0806)  BP: (!) 107/53 (12/08/24 0806)  SpO2: 98 % (12/08/24 0806) Vital Signs (24h Range):  Temp:  [97.5 °F (36.4 °C)-98.1 °F (36.7 °C)] 98 °F (36.7 °C)  Pulse:  [71-84] 81  Resp:  [16-20] 18  SpO2:  [97 %-99 %] 98 %  BP: ()/(49-55) 107/53     Weight: 67.5 kg (148 lb 13 oz)  Body mass index is 27.22 kg/m².     Physical Exam  Vitals and nursing note  "reviewed.   Constitutional:       Appearance: Normal appearance. She is normal weight.   HENT:      Head: Normocephalic and atraumatic.   Pulmonary:      Effort: Pulmonary effort is normal. No respiratory distress.   Musculoskeletal:      Right lower leg: No edema.      Left lower leg: No edema.   Skin:     General: Skin is warm and dry.   Neurological:      General: No focal deficit present.      Mental Status: She is alert and oriented to person, place, and time. Mental status is at baseline.      GCS: GCS eye subscore is 4. GCS verbal subscore is 5. GCS motor subscore is 6.            NEUROLOGICAL EXAMINATION:     MENTAL STATUS   Oriented to person, place, and time.       Significant Labs: All pertinent lab results from the past 24 hours have been reviewed.    Significant Studies: EEG: I have reviewed all pertinent results/findings within the past 24 hours    Assessment and Plan:     * Refractory epilepsy  Mesial temporal sclerosis     34 y.o. female with refractory epilepsy who was referred by Dr. Elam for event capture/characterization and surgical planning. Increased seizure frequency since June 2024. Typical seizure - aura with hearing a buzzing or extremely loud train noise, squiggles in vision, rising in stomach followed by "staring," occasionally progresses to GTC. Home meds Vimpat 200 mg BID and Lamictal  mg po qhs.     - Continuous vEEG   - Hold home Lamictal since admission  - Hold Vimpat beginning 12/7 PM (was decreased to 100 mg BID at admission)  - AED levels pending   - Activation procedures per protocol- medication adjustments as above; sleep deprived with meds 12/7>12/8  - IV Ativan PRN for GTC greater than 5 min - please call epilepsy on call   - Seizure precautions, suction and oxygen at bedside  - Fall precautions, side rail padding in place  - Visi monitoring with continuous pulse oximetry   - Telemetry    Non-epileptic event  Patient had witnessed event morning of 12/7 where she " "repeated "I don't want to go there."  at bedside stated that it was consistent with one of her event types. There was no EEG correlate with this event.     - Consult Neuropsychology, appreciate assistance    Mesial temporal sclerosis  See "Refractory epilepsy"        VTE Risk Mitigation (From admission, onward)           Ordered     IP VTE LOW RISK PATIENT  Once         12/06/24 1133     Place sequential compression device  Until discontinued         12/06/24 1133                    Burke Isidro MD  Neurology-Epilepsy  [facility] - Neurosurgery ([facility])  "

## 2024-12-08 NOTE — NURSING
Patient has orders to be sleep deprived until 0500. Sleep deprivation orders reviewed with patient/ at bedside handoff, verbalized understanding. No acute signs of distress noted at this time.       EMU monitor tech notified of sleep deprivation orders.

## 2024-12-08 NOTE — ASSESSMENT & PLAN NOTE
"Mesial temporal sclerosis     34 y.o. female with refractory epilepsy who was referred by Dr. Elam for event capture/characterization and surgical planning. Increased seizure frequency since June 2024. Typical seizure - aura with hearing a buzzing or extremely loud train noise, squiggles in vision, rising in stomach followed by "staring," occasionally progresses to GTC. Home meds Vimpat 200 mg BID and Lamictal  mg po qhs.     - Continuous vEEG   - Hold home Lamictal since admission  - Hold Vimpat beginning 12/7 PM (was decreased to 100 mg BID at admission)  - AED levels pending   - Activation procedures per protocol- medication adjustments as above; sleep deprived with meds 12/7>12/8  - IV Ativan PRN for GTC greater than 5 min - please call epilepsy on call   - Seizure precautions, suction and oxygen at bedside  - Fall precautions, side rail padding in place  - Visi monitoring with continuous pulse oximetry   - Telemetry  "

## 2024-12-08 NOTE — NURSING
Successful sleep deprivation per provider order. Patient may sleep from 0500 to 0700, then patient to remain awake. VS obtained. Interruptions minimized to promote sleep. No acute signs of distress noted at this time.

## 2024-12-08 NOTE — PLAN OF CARE
Problem: Adult Inpatient Plan of Care  Goal: Plan of Care Review  Outcome: Progressing  Goal: Patient-Specific Goal (Individualized)  Outcome: Progressing  Goal: Absence of Hospital-Acquired Illness or Injury  Outcome: Progressing  Goal: Optimal Comfort and Wellbeing  Outcome: Progressing  Goal: Readiness for Transition of Care  Outcome: Progressing     Problem: Fall Injury Risk  Goal: Absence of Fall and Fall-Related Injury  Outcome: Progressing     Problem: Seizure, Active Management  Goal: Absence of Seizure/Seizure-Related Injury  Outcome: Progressing           POC reviewed and updated with the patient/spouse. Questions regarding POC were encouraged and addressed with the patient.  VSS, see flow-sheets. Tele maintained per order.   KATIEON. Patient is AO X 4 at this time. Continuous EEG in place. Fall/safety precautions maintained, no signs of injury noted during shift. Seizure precautions maintained. Upon exiting room, patient's bed locked in low position, side rails up x 4, with call light within reach. Instructed patient to call staff for assistance, verbalized understanding.  No acute signs of distress noted at this time.

## 2024-12-08 NOTE — PROCEDURES
EPILEPSY MONITORING UNIT VIDEO EEG REPORT     Date of service: 12/7/24 - 12/8/24  EEG Numbers: [id number]  Requesting Physician: Dr. Prashanth Elam     Introduction  Electroencephalographic (EEG) is recorded with electrodes placed   according to the International 10-20 placement system.  Thirty Two (32) channels   of digital signal, including T1 and T2 electrodes, are simultaneously recorded   from the scalp and may also include EKG, EMG and/or eye movement monitors.    Recording band pass was 0.1 to 512 Hz.  Digital video recording of the patient   is simultaneously recorded with the EEG.  The patient is instructed to report   clinical symptoms which may occur during the recording session.  EEG and video   recording are stored and archived in digital format.  Activation procedures,   which include photic stimulation, hyperventilation and instructing patients to   perform simple tasks, are done in selected patients.     The EEG is displayed on a monitor screen and can be reformatted into different   montages for evaluation.  The entire recoding is submitted for computer-assisted   analysis to detect spike and electrographic seizure activity.  The entire   recording is visually reviewed, and the times identified by computer analysis as   being spikes or seizures are reviewed again.     Compressed spectral analysis (CSA) is also performed on the activity recorded   from each individual channel.  This is displayed as a power display of   frequencies from 0 to 30 Hz over time.  The CSA analysis is done and displayed   continuously.  This is reviewed for asymmetries in power between homologous   areas of the scalp and for presence of changes in power which can be seen when   seizures occur.  Sections of suspected abnormalities on the CSA are then   compared with the original EEG recording.     [employer] software was also utilized in the review of this study.  This software   suite analyzes the EEG recording in  "multiple domains.  Coherence and rhythmicity   are computed to identify EEG sections which may contain organized seizures.    Each channel undergoes analysis to detect presence of spike and sharp waves   which have special and morphological characteristics of epileptic activity.  The   routine EEG recording is converted from special into frequency domain.  This is   then displayed comparing homologous areas to identify areas of significant   asymmetry.  Algorithm to identify non-cortically generated artifact is used to   separate artifact from the EEG.     Recording Times  12/7/24 07:01 - 12/8/24 07:00  A total of 23 hours and 56 minutes was recorded.     Findings  BACKGROUND: The patient's background consists of a posteriorly dominant 9 Hz alpha rhythm.  Anteriorly, the patient's background consists of predominantly a mixture of beta and 5-7 Hz theta range frequencies. There is no focal slowing.       During the course of the recording, the patient is noted to be awake, and subsequently becomes drowsy, and falls asleep with normal, symmetric sleep architecture present.      Hyperventilation and photic stimulation were not performed.      INTERICTAL: None     CLINICAL EVENTS: The patient had a clinical event on 12/7/24 at 10:14. The patient began crying out and hyperventilating. She was saying the phrase "I don't want to go" repeatedly. She reached out to her  and had shaking of bilateral upper extremities. Eyes were closed and she was unresponsive. She did not recall memory words given during the event and it took her 2 minutes to say her name after the event ended. No epileptic activity was seen.      EKG demonstrates regular rhythm.     Interpretation  This is a normal EEG during wakefulness and sleep. No potentially epileptiform discharges were seen.  An episode of unresponsiveness, bilateral upper extremity shaking and saying a repetitive phrase was without EEG correlate and consistent with a " nonepileptic event.     Please see prior and subsequent EEG reports for details of the remainder of the epilepsy monitoring unit stay.

## 2024-12-08 NOTE — ASSESSMENT & PLAN NOTE
"Patient had witnessed event morning of 12/7 where she repeated "I don't want to go there."  at bedside stated that it was consistent with one of her event types. There was no EEG correlate with this event.     - Consult Neuropsychology, appreciate assistance  "

## 2024-12-08 NOTE — SUBJECTIVE & OBJECTIVE
Interval History: EEG normal, no further clinical events.     Current Facility-Administered Medications   Medication Dose Route Frequency Provider Last Rate Last Admin    acetaminophen tablet 650 mg  650 mg Oral Q4H PRN Burke Isidro MD   650 mg at 12/07/24 1026    docusate sodium capsule 100 mg  100 mg Oral BID PRN Burke Isidro MD        LORazepam injection 2 mg  2 mg Intravenous Q5 Min PRN Burke Isidro MD        sodium chloride 0.9% flush 10 mL  10 mL Intravenous PRN Burke Isidro MD   10 mL at 12/07/24 2115     Continuous Infusions: None    Review of Systems   Psychiatric/Behavioral:  Positive for sleep disturbance.      Objective:     Vital Signs (Most Recent):  Temp: 98 °F (36.7 °C) (12/08/24 0806)  Pulse: 81 (12/08/24 0806)  Resp: 18 (12/08/24 0806)  BP: (!) 107/53 (12/08/24 0806)  SpO2: 98 % (12/08/24 0806) Vital Signs (24h Range):  Temp:  [97.5 °F (36.4 °C)-98.1 °F (36.7 °C)] 98 °F (36.7 °C)  Pulse:  [71-84] 81  Resp:  [16-20] 18  SpO2:  [97 %-99 %] 98 %  BP: ()/(49-55) 107/53     Weight: 67.5 kg (148 lb 13 oz)  Body mass index is 27.22 kg/m².     Physical Exam  Vitals and nursing note reviewed.   Constitutional:       Appearance: Normal appearance. She is normal weight.   HENT:      Head: Normocephalic and atraumatic.   Pulmonary:      Effort: Pulmonary effort is normal. No respiratory distress.   Musculoskeletal:      Right lower leg: No edema.      Left lower leg: No edema.   Skin:     General: Skin is warm and dry.   Neurological:      General: No focal deficit present.      Mental Status: She is alert and oriented to person, place, and time. Mental status is at baseline.      GCS: GCS eye subscore is 4. GCS verbal subscore is 5. GCS motor subscore is 6.            NEUROLOGICAL EXAMINATION:     MENTAL STATUS   Oriented to person, place, and time.       Significant Labs: All pertinent lab results from the past 24 hours have been reviewed.    Significant Studies: EEG: I have reviewed all pertinent  results/findings within the past 24 hours

## 2024-12-09 PROBLEM — F44.9 CONVERSION DISORDER: Status: ACTIVE | Noted: 2024-12-07

## 2024-12-09 LAB
LACOSAMIDE: 1.1 MCG/ML (ref 1–10)
LAMOTRIGINE SERPL-MCNC: 4.2 UG/ML (ref 2–15)
OHS QRS DURATION: 74 MS
OHS QTC CALCULATION: 379 MS

## 2024-12-09 PROCEDURE — 11000001 HC ACUTE MED/SURG PRIVATE ROOM

## 2024-12-09 PROCEDURE — 95720 EEG PHY/QHP EA INCR W/VEEG: CPT | Mod: ,,, | Performed by: PSYCHIATRY & NEUROLOGY

## 2024-12-09 PROCEDURE — 99223 1ST HOSP IP/OBS HIGH 75: CPT | Mod: FS,,, | Performed by: PSYCHIATRY & NEUROLOGY

## 2024-12-09 PROCEDURE — 94761 N-INVAS EAR/PLS OXIMETRY MLT: CPT

## 2024-12-09 PROCEDURE — 99499 UNLISTED E&M SERVICE: CPT | Mod: ,,, | Performed by: PHYSICIAN ASSISTANT

## 2024-12-09 PROCEDURE — 25000003 PHARM REV CODE 250

## 2024-12-09 PROCEDURE — 90791 PSYCH DIAGNOSTIC EVALUATION: CPT | Mod: 95,,, | Performed by: CLINICAL NEUROPSYCHOLOGIST

## 2024-12-09 PROCEDURE — A4216 STERILE WATER/SALINE, 10 ML: HCPCS

## 2024-12-09 PROCEDURE — 95714 VEEG EA 12-26 HR UNMNTR: CPT

## 2024-12-09 RX ORDER — DIPHENHYDRAMINE HCL 50 MG
50 CAPSULE ORAL ONCE
Status: COMPLETED | OUTPATIENT
Start: 2024-12-10 | End: 2024-12-10

## 2024-12-09 RX ORDER — TRAMADOL HYDROCHLORIDE 50 MG/1
50 TABLET ORAL ONCE
Status: DISCONTINUED | OUTPATIENT
Start: 2024-12-10 | End: 2024-12-09

## 2024-12-09 RX ORDER — TRAMADOL HYDROCHLORIDE 50 MG/1
50 TABLET ORAL ONCE
Status: COMPLETED | OUTPATIENT
Start: 2024-12-10 | End: 2024-12-10

## 2024-12-09 RX ORDER — DIPHENHYDRAMINE HCL 50 MG
50 CAPSULE ORAL ONCE
Status: DISCONTINUED | OUTPATIENT
Start: 2024-12-10 | End: 2024-12-09

## 2024-12-09 RX ADMIN — ACETAMINOPHEN 650 MG: 325 TABLET ORAL at 09:12

## 2024-12-09 RX ADMIN — Medication 10 ML: at 09:12

## 2024-12-09 NOTE — CONSULTS
NEUROPSYCHOLOGY INPATIENT CONSULT - CONFIDENTIAL    Referring Provider: Prashanth Elam MD   Medical Necessity: Evaluate mood and personality functioning to assist in differential diagnosis of seizure-like episodes.  Date Conducted:  2024  Present At Visit: Lina Rutledge and her mother, Sangeeta Rutledge  Billin = 45 minutes  Referral Diagnoses: F44.4 Conversion Disorder   Consent: The patient expressed an understanding of the purpose of the evaluation and consented to all procedures. She additionally provided consent to speak with her mother Sangeeta, who was present during the appointment today. We discussed the limits of confidentiality and discussed an emergency plan.    ASSESSMENT & PLAN     Ms. Lina Rutledge is a 34 y.o. female with refractory epilepsy who was referred by Dr. Elam for event capture/characterization and surgical planning. She has a history of seizure-like events characterized by staring and collapsing that started approximately six years ago. Her mother reported one episode at the age of [age] when she saw bubbles and then collapsed to the floor. She denied having another event until six years ago and reported that her seizure like events decreased with medication. However, Ms. Rutledge's EEGs since  have all been normal, and did not show signs of epilepsy. The present evaluation was requested to assess for risk factors associated with psychogenic nonepileptic events (PNEE).     Overall, Ms. Rutledge denied a significant past of depression, anxiety, abuse, and traumatic events. She reported that her most significant stressors are related to her medical concerns (structural abnormality on brain and collapsing events) and her recent move. She denied having ever worked with a therapist and reported that to cope with the occasional anxiety she feels she will play with puzzles or read books. The following treatment plan was discussed with the patient in case concurrent  epilepsy is ruled out:     Psychoeducation - The patient was provided with psychoeducation on PNEE and given a handout.   Psychotherapy - Local mental health treatment options were discussed and the patient was encouraged to look up possible therapist with whom she may feel comfortable working.    Medical/Neurology Follow-up - Continued medical assessment and follow-up as recommended by Neurology. It is important to note that a diagnosis or impression of Conversion Disorder or Somatization Disorder does not rule other medical symptoms in need of assessment/management.   Follow-up visit with Neuropsychology - Not indicated in the setting of Conversion Disorder workup. The patient was encouraged to contact us if she believed we could be of further assistance.     Problem List Items Addressed This Visit          Neuro    * (Principal) Refractory epilepsy - Primary    Seizure-like activity     Other Visit Diagnoses       Partial symptomatic epilepsy with complex partial seizures, intractable, without status epilepticus        Relevant Orders    Admit to Inpatient (Completed)    EMU Monitoring    Seizure        Relevant Orders    EKG, 12 - Lead (Completed)    EKG 12-lead (Completed)          Thank you for allowing me to assist in Ms. Lina Rutledge's care. If you have any questions, please contact me at [phone].     Rhina Douglas, PhD  Postdoctoral Fellow in Clinical Neuropsychology  [facility] - Department of Neurology      Peggy Hawkins, Ph.D., ABPP  Board Certified in Clinical Neuropsychology   [facility] - Department of Neurology    SUBJECTIVE   She reported having had the one event where she fainted at the age of [age]. She stated that in high school she began having these stabbing paints that would occur in random areas and last for a few seconds before stopping. She reported not ever having known what caused these pains as they stopped during college. From childhood until today she  "periodically hears the sound of a train or loud buzzing. She reported occasionally smelling something electrical burning. She stated that these events occur randomly, sometimes preceded an seizure like event and other times do not. She reported occasionally seeing squiggles and colored shapes that float around and change colors over time. She stated that these are not eye floaters but shapes that are further away. She reported that the squiggles, shapes, and sounds have been periodically occurring since she was young, and until recently she believed everyone had similar experiences. She reported that she will have one of these events occur once or twice a week.     She reports a history of "[place] Syndrome: where aspects of her environment or she herself will appear larger or smaller than they actually are. She reported that at night at times before going to sleep she will feel like she's growing or shrinking. She stated that this will occasionally happen and has for years.     She stated that her first seizure like event since [age]-years-old was six years ago when she was speaking with her now fiancé in her driveway. She turned and felt hot and as though she was going to faint. She then tensed up and collapsed.     Current Psychiatric Symptoms  Mood: She reported having okay mood.  Hyun/Hypomania: no  Depression: no  Current Suicidal ideation, intention, or plan: no  Apathy/Indifference: no  Anxiety: She reported anxiety connected to recently move and that she periodically has anxiety but nothing significant or long lasting.  Stress & Recent Stressors: She stated that her move six months ago from [place], [place] was very stressful. She denied it being more stressful than any other move a family has. Ms. Rutledge also identified her ex-  as a stressor. He currently does not contact her often.  Coping Mechanisms: She puts together puzzles  Neurovegetative Sxs:  Appetite: good " "no changes  Sleep:  She described getting great sleep at night (around 8 hours) and denied waking up multiple times throughout the night, only on occasion due to her cats.  Energy: She reported variable energy with some days having a lot and other days she feels fatigued and has little.      Hallucinations: When she was [age] years old she once saw bubbles, asking her mother if she saw them too before collapsing. She reported that for many years she periodically hears the sound of a train or loud buzzing. She reported occasionally smelling something electrical burning. She stated that these events occur randomly, sometimes preceded a seizure like event and other times do not.     She reported occasionally seeing squiggles and colored shapes that float around and change colors over time. She stated that these are not eye floaters but shapes that are further away. She reported that the squiggles, shapes, and sounds have been periodically occurring since she was young, and until recently she believed everyone had similar experiences.     She reports a history of "[place] in [place] Syndrome: where aspects of her environment or she herself will appear larger or smaller than they actually are. She reported that at night at times before going to sleep she will feel like she's growing or shrinking. She stated that this will occasionally happen and has for years.     Delusional/Paranoid Thinking: no  Impulsivity: Sometimes she is impulsive  Compulsivity: no  Disinhibition: no  Irritability/Agitation: no  Aggression: no    Past Psychiatric History  Prior Diagnoses: She reported not having a history of significant depression or  anxiety.   History of Trauma: no  History of Abuse: no  History of Suicide Attempts: no  Self-injurious Behaviors: no  Homicidal Attempts: no    Psychiatric Treatment  Psychiatric Hospitalization(s): no  IOP Programs: no  Medication(s):   Current: none  Previous: She was prescribed Lexapro and then " "discontinued use after about a year of use and started taking Paxil. Stopped taking Lexapro in 2020 and Paxil in 2022.   Follows with Psychiatry: no  Psychotherapy/Counseling: no    Substance Use History  Social History     Tobacco Use    Smoking status: Every Day     Current packs/day: 0.25     Types: Cigarettes    Smokeless tobacco: Current   Substance and Sexual Activity    Alcohol use: Not Currently    Drug use: Never    Sexual activity: Not Currently     History of abuse/overuse: no  History of treatment: no    Psychosocial  Relationship Status: Engaged (been with partner for six years)  Children: Has two children, currently lives with partner and children  Years of Education: Bachelor's degree in childhood education  Work Status: Doesn't work, 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     Support System: Good support system    Cognitive Concerns: She stated that there are random aspects of her past that she can't remember. She reported having lost information from the distant past and from the previous week. She reported noticing that she at times will lose details of conversations or events that recently happened.      No difficulties with independently completing basic and instrumental activities of daily living.     OBJECTIVE     MENTAL STATUS AND OBSERVATIONS   Appearance: Adequate grooming/hygiene.   Alertness: Attentive and alert.   Gait: Unremarkable  Motor movements/mannerisms: Unremarkable  Speech/language: Normal in rate, rhythm, tone, and volume. No significant word finding difficulty observed. Comprehension was normal.  Mood/Affect: The patients stated mood was "fine" Affect was congruent with stated mood.   Interpersonal Behavior: Rapport was quickly and easily established   Suicidality/Homicidality: Denied  Hallucinations/Delusions: None evidenced or endorsed  Thought Content & " Processes: Thoughts seemed logical and goal-directed.   Insight & Judgment: Appropriate  Participation in Clinical Interview: Full with collateral

## 2024-12-09 NOTE — PLAN OF CARE
Problem: Adult Inpatient Plan of Care  Goal: Plan of Care Review  Outcome: Progressing  Goal: Patient-Specific Goal (Individualized)  Outcome: Progressing  Goal: Absence of Hospital-Acquired Illness or Injury  Outcome: Progressing  Goal: Optimal Comfort and Wellbeing  Outcome: Progressing  Goal: Readiness for Transition of Care  Outcome: Progressing     Problem: Fall Injury Risk  Goal: Absence of Fall and Fall-Related Injury  Outcome: Progressing     Problem: Seizure, Active Management  Goal: Absence of Seizure/Seizure-Related Injury  Outcome: Progressing           POC reviewed and updated with the patient/mother. Questions regarding POC were encouraged and addressed with the patient.  VSS, see flow-sheets. Tele and continuous pulse ox maintained per provider's order. Continuous EEG in place. Patient is AO X 4 at this time. Seizure, fall, and safety precautions maintained, no signs of injury noted during shift.  Upon exiting room, patient's bed locked in low position, side rails up x 4, bed alarm set, with call light within reach. Instructed patient/ mother to call staff for assistance, verbalized understanding.  No acute signs of distress noted at this time.

## 2024-12-09 NOTE — PROGRESS NOTES
"   12/08/24 2006   Seizure Episode   Seizure Activity witnessed   Seizure Presentation crying;repetitive movement   Seizure Duration 20 minutes   Seizure Areas Involved generalized   Seizure Movement mild   Seizure Interventions airway patency maintained;safe environment provided;pulse oximetry initiated   Seizure Postictal Care assessed for injury;emotional support provided;safe environment provided           EMU SEIZURE EVENT NOTE    Event button pressed at 2006 for typical event presentation. Patient with complaints of full body tingling sensation/pain and crying inconsolably. Per patient's mother, this is how patient's events start. Event lasted approximately 20 minutes. Patient remained oriented and able to follow simple commands throughout entire event. Epilepsy provider on call (Burke Isidro MD) notified of event. Provider placed orders for 6 mg melatonin prn to see if it would help patient relax and get some rest. Prn melatonin offered to patient, but patient refused, stating "melatonin gives me nightmares". Seizure precautions maintained, no signs of injury noted. Neurological assessment completed and VS monitored per protocol. Sinus tachycardia noted on the monitor, but not sustaining through event, see flowsheets for details. Neurologically, patient remains at her baseline and able to follow commands. Patient and mother instructed to call staff for assistance, verbalized understanding. Instructed patient/ mother to press event button if patient has any further aura or event presentations, verbalized understanding. No acute signs of distress noted at this time.         Notified: Burke Isidro MD (2030)  "

## 2024-12-09 NOTE — PROGRESS NOTES
"[facility] ([facility]  Neurology-Epilepsy  Progress Note    Patient Name: Lina Rutledge  MRN: [id number]  Admission Date: 12/6/2024  Hospital Length of Stay: 3 days  Code Status: Full Code   Attending Provider: Helder Valencia MD  Primary Care Physician: No, Primary Doctor   Principal Problem:Refractory epilepsy    Subjective:     Hospital Course:   12/6>12/7: Home Lamictal held at admission and Vimpat decreased to 100 mg BID. Patient had witnessed non-epileptic event this morning where she was repeating a phrase;  said this is one of her typical event types, there was no EEG correlate with this event. Plan for hold Vimpat beginning this evening and undergo sleep deprivation with meds overnight.   12/7>12/8: Successfully sleep deprived until 5:00AM. EEG normal without any further clinical events.   12/8>12/9: PB ~2026 for episode described as heart racing and "not feeling well" followed by crying, hyperventilating, tingling sensation in whole body, and "fidgeting" with clothing/blankets with impaired awareness with no EEG correlate. Neuropsychology evaluation today for nonepileptic events. EEG remains WNL. Continue with provoking measures to evaluate for concurrent epilepsy- repeat sleep deprivation tonight and activation medications 12/10 AM.    See EEG reports for details.    Interval History: PB overnight ~830 PM for event that began with heart racing reported by patient and "not feeling well." Patient does not recall remainder of event. Mother at bedside reports patient became emotional, crying, hyperventilating, tingling sensation in whole body, and "fidgeting" with clothing/blankets. This AM, patient sitting upright in bed with mother at bedside. Patient endorses mild headache, offered PRN analgesics. No other complaints. Discussed plan of care including Neuropsychology evaluation today for nonepileptic events, repeat sleep deprivation tonight and activation medications 12/10 AM.    Current " Facility-Administered Medications   Medication Dose Route Frequency Provider Last Rate Last Admin    acetaminophen tablet 650 mg  650 mg Oral Q4H PRN Burke Isidro MD   650 mg at 12/09/24 0955    docusate sodium capsule 100 mg  100 mg Oral BID PRN Burke Isidro MD        LORazepam injection 2 mg  2 mg Intravenous Q5 Min PRN Burke Isidro MD        sodium chloride 0.9% flush 10 mL  10 mL Intravenous PRN Burke Isidro MD   10 mL at 12/08/24 2056     Continuous Infusions:    Review of Systems  Objective:     Vital Signs (Most Recent):  Temp: 97.8 °F (36.6 °C) (12/09/24 1206)  Pulse: 71 (12/09/24 1206)  Resp: 18 (12/09/24 1206)  BP: (!) 94/45 (12/09/24 1206)  SpO2: 99 % (12/09/24 1206) Vital Signs (24h Range):  Temp:  [97.6 °F (36.4 °C)-98 °F (36.7 °C)] 97.8 °F (36.6 °C)  Pulse:  [] 71  Resp:  [16-20] 18  SpO2:  [96 %-100 %] 99 %  BP: ()/(42-79) 94/45     Weight: 67.5 kg (148 lb 13 oz)  Body mass index is 27.22 kg/m².     Physical Exam  Vitals reviewed.   Constitutional:       General: She is not in acute distress.     Appearance: She is not diaphoretic.   HENT:      Head: Normocephalic and atraumatic.      Comments: EEG in place  Eyes:      General: No scleral icterus.        Right eye: No discharge.         Left eye: No discharge.      Extraocular Movements: Extraocular movements intact and EOM normal.      Conjunctiva/sclera: Conjunctivae normal.      Pupils: Pupils are equal, round, and reactive to light.   Cardiovascular:      Rate and Rhythm: Normal rate.   Pulmonary:      Effort: Pulmonary effort is normal. No respiratory distress.   Skin:     General: Skin is warm and dry.   Neurological:      General: No focal deficit present.      Mental Status: She is alert and oriented to person, place, and time. Mental status is at baseline.   Psychiatric:         Mood and Affect: Mood normal.         Speech: Speech normal.         Behavior: Behavior normal.            NEUROLOGICAL EXAMINATION:     MENTAL STATUS  "  Oriented to person, place, and time.   Attention: normal. Concentration: normal.   Speech: speech is normal   Level of consciousness: alert    CRANIAL NERVES     CN II   Visual fields full to confrontation.     CN III, IV, VI   Pupils are equal, round, and reactive to light.  Extraocular motions are normal.     CN V   Right corneal reflex: normal  Left corneal reflex: normal    CN VII   Facial expression full, symmetric.     CN VIII   Hearing: intact    CN XI   CN XI normal.     CN XII   CN XII normal.       Significant Labs: All pertinent lab results from the past 24 hours have been reviewed.    Significant Studies: I have reviewed all pertinent imaging results/findings within the past 24 hours.  Assessment and Plan:     * Refractory epilepsy  Mesial temporal sclerosis  34F PMHx of migraines and seizure disorder maintained on Lacosamide 200 mg BID and Lamotrigine  mg qhs who was referred to EMU by Dr. Elam for event capture and characterization as well as consideration of surgical evaluation due to reports of increased event frequency since June 2024. Typical events described as aura with hearing a buzzing or extremely loud train noise, squiggles in vision, rising in stomach followed by "staring" which occasionally progresses to GTC.     - Continuous vEEG   - Hold home Lamotrigine on admission, Lacosamide decreased to 100 mg BID on admit > held 12/7 PM  - AED levels pending   - Activation procedures per protocol- medication adjustments as above, repeat sleep deprivation tonight, activation medications 12/10 AM  - Neuropsychology evaluation today for nonepileptic events  - IV Ativan PRN for GTC greater than 5 min - please call epilepsy on call   - Seizure precautions, suction and oxygen at bedside  - Fall precautions, side rail padding in place  - Visi monitoring with continuous pulse oximetry   - Telemetry    Conversion disorder  - Per chart review, patient had 2 typical events with no EEG correlate " "during EMU admission in [place] in 6/2020  - Additional EMU admission in [place] 9/2020 with normal EEG, no events  - Typical event 12/7 AM for crying, hyperventilating, repeating phrase "I don't want to go" followed by shaking of BUE and unresponsiveness with no EEG correlate  - Additional event 12/8 PM for heart racing, "not feeling well", crying, hyperventilating, tingling sensation in whole body, and "fidgeting" with clothing/blankets with impaired awareness with no EEG correlate  - Neuropsychology evaluation 12/9; see note for details    Migraine without aura and without status migrainosus, not intractable  - Chronic, uses Rizatriptan PRN as outpatient  - Analgesics PRN    Mesial temporal sclerosis  See "Refractory epilepsy"        VTE Risk Mitigation (From admission, onward)           Ordered     IP VTE LOW RISK PATIENT  Once         12/06/24 1133     Place sequential compression device  Until discontinued         12/06/24 1133                    Mitzi Connell PA-C  Neurology-Epilepsy  [facility] - EMU  Staff: Dr. Valencia  "

## 2024-12-09 NOTE — ASSESSMENT & PLAN NOTE
"Mesial temporal sclerosis  34F PMHx of migraines and seizure disorder maintained on Lacosamide 200 mg BID and Lamotrigine  mg qhs who was referred to EMU by Dr. Elam for event capture and characterization as well as consideration of surgical evaluation due to reports of increased event frequency since June 2024. Typical events described as aura with hearing a buzzing or extremely loud train noise, squiggles in vision, rising in stomach followed by "staring" which occasionally progresses to GTC.     - Continuous vEEG   - Hold home Lamotrigine on admission, Lacosamide decreased to 100 mg BID on admit > held 12/7 PM  - AED levels pending   - Activation procedures per protocol- medication adjustments as above, repeat sleep deprivation tonight, activation medications 12/10 AM  - Neuropsychology evaluation today for nonepileptic events  - IV Ativan PRN for GTC greater than 5 min - please call epilepsy on call   - Seizure precautions, suction and oxygen at bedside  - Fall precautions, side rail padding in place  - Visi monitoring with continuous pulse oximetry   - Telemetry  "

## 2024-12-09 NOTE — PROCEDURES
EEG REPORT      Lina Rutledge  [id number]  1990    DATE OF SERVICE: 12/8/2024    [facility] -3    METHODOLOGY      Extended electroencephalographic recording is made while the patient is ambulatory and continuing normal daily activities.  Electrodes are placed according to the International 10-20 placement system and included T1 and T2 electrode placement.  Twenty four (24) channels of digital signal (sampling rate of 512/sec) was simultaneously recorded from the scalp including EKG and eye monitors.  Recording band pass was 0.1 to 100 hz and all data was stored digitally on the recorder.  The patient is instructed to press an event button when clinical symptoms occur and write the symptoms into a diary. Activation procedures which include photic stimulation, hyperventilation and instructing patients to perform simple task are done in selected patients.        The EEG is displayed on a monitor screen and can be reformatted into different montages for evaluation.  The entire recoding is submitted for computer assisted analysis to detect spike and electrographic seizure activity.  The entire recording is visually reviewed and the times identified by computer analysis as being spikes or seizures are reviewed again.  Compresses spectral analysis (CSA) is also performed on the activity recorded from each individual channel.  This is displayed as a power display of frequencies from 0 to 30 Hz over time.   The CSA analysis is done and displayed continuously.  This is reviewed for asymmetries in power between homologous areas of the scalp and for presence of changes in power which canbe seen when seizures occur.  Sections of suspected abnormalities on the CSA is then compared with the original EEG recording.   [employer] software was also utilized in the review of this study.  This software suite analyzes the EEG recording in multiple domains.  Coherence and rhythmicity is computed to identify EEG sections which may  contain organized seizures.  Each channel undergoes analysis to detect presence of spike and sharp waves which have special and morphological characteristic of epileptic activity.  The routine EEG recording is converted from spacial into frequency domain.  This is then displayed comparing homologous areas to identify areas of significant asymmetry.  Algorithm to identify non-cortically generated artifact is used to separate eye movement, EMG and other artifact from the EEG     Recording Times  Start on 12/8/2024  Stop on 12/9/2024    A total of 23:56:54 hours of EEG was recorded.      EEG FINDINGS:  Background activity:   The background rhythm was characterized by alpha and anterior dominant beta activity with a 10Hz posterior dominant alpha rhythm at 30-70 microvolts.   Symmetry and continuity: the background was continuous and symmetric     Sleep:   Normal sleep transients including sleep spindles, K complexes, vertex waves and POSTS were seen.    Activation procedures:   NA    Abnormal activity:   No epileptiform discharges, periodic discharges, lateralized rhythmic delta activity or electrographic seizures were seen.    There is an event danuta at 20:26.  Video is not available but EEG shows only normal waking rhythms    IMPRESSION:   Normal one day video EEG with recording of one event which was non-epileptic in etiology.      Helder Valencia MD  Neurology-Epilepsy.  [facility].

## 2024-12-09 NOTE — SUBJECTIVE & OBJECTIVE
"Interval History: PB overnight ~830 PM for event that began with heart racing reported by patient and "not feeling well." Patient does not recall remainder of event. Mother at bedside reports patient became emotional, crying, endorsed tingling sensation in whole body, and "fidgeting" with clothing/blankets. This AM, patient sitting upright in bed with mother at bedside. Patient endorses mild headache, offered PRN analgesics. No other complaints. Discussed plan of care including Neuropsychology evaluation today for nonepileptic events, repeat sleep deprivation tonight and activation medications 12/10 AM.    Current Facility-Administered Medications   Medication Dose Route Frequency Provider Last Rate Last Admin    acetaminophen tablet 650 mg  650 mg Oral Q4H PRN Burke Isidro MD   650 mg at 12/09/24 0955    docusate sodium capsule 100 mg  100 mg Oral BID PRN Burke Isidro MD        LORazepam injection 2 mg  2 mg Intravenous Q5 Min PRN Burke Isidro MD        sodium chloride 0.9% flush 10 mL  10 mL Intravenous PRN Burke Isidro MD   10 mL at 12/08/24 2056     Continuous Infusions:    Review of Systems  Objective:     Vital Signs (Most Recent):  Temp: 97.8 °F (36.6 °C) (12/09/24 1206)  Pulse: 71 (12/09/24 1206)  Resp: 18 (12/09/24 1206)  BP: (!) 94/45 (12/09/24 1206)  SpO2: 99 % (12/09/24 1206) Vital Signs (24h Range):  Temp:  [97.6 °F (36.4 °C)-98 °F (36.7 °C)] 97.8 °F (36.6 °C)  Pulse:  [] 71  Resp:  [16-20] 18  SpO2:  [96 %-100 %] 99 %  BP: ()/(42-79) 94/45     Weight: 67.5 kg (148 lb 13 oz)  Body mass index is 27.22 kg/m².     Physical Exam  Vitals reviewed.   Constitutional:       General: She is not in acute distress.     Appearance: She is not diaphoretic.   HENT:      Head: Normocephalic and atraumatic.      Comments: EEG in place  Eyes:      General: No scleral icterus.        Right eye: No discharge.         Left eye: No discharge.      Extraocular Movements: Extraocular movements intact and EOM " normal.      Conjunctiva/sclera: Conjunctivae normal.      Pupils: Pupils are equal, round, and reactive to light.   Cardiovascular:      Rate and Rhythm: Normal rate.   Pulmonary:      Effort: Pulmonary effort is normal. No respiratory distress.   Skin:     General: Skin is warm and dry.   Neurological:      General: No focal deficit present.      Mental Status: She is alert and oriented to person, place, and time. Mental status is at baseline.   Psychiatric:         Mood and Affect: Mood normal.         Speech: Speech normal.         Behavior: Behavior normal.            NEUROLOGICAL EXAMINATION:     MENTAL STATUS   Oriented to person, place, and time.   Attention: normal. Concentration: normal.   Speech: speech is normal   Level of consciousness: alert    CRANIAL NERVES     CN II   Visual fields full to confrontation.     CN III, IV, VI   Pupils are equal, round, and reactive to light.  Extraocular motions are normal.     CN V   Right corneal reflex: normal  Left corneal reflex: normal    CN VII   Facial expression full, symmetric.     CN VIII   Hearing: intact    CN XI   CN XI normal.     CN XII   CN XII normal.       Significant Labs: All pertinent lab results from the past 24 hours have been reviewed.    Significant Studies: I have reviewed all pertinent imaging results/findings within the past 24 hours.

## 2024-12-09 NOTE — ASSESSMENT & PLAN NOTE
"- Per chart review, patient had 2 typical events with no EEG correlate during EMU admission in [place] in 6/2020  - Additional EMU admission in [place] 9/2020 with normal EEG, no events  - Typical event 12/7 AM for crying, hyperventilating, repeating phrase "I don't want to go" followed by shaking of BUE and unresponsiveness with no EEG correlate  - Additional event 12/8 PM for heart racing, "not feeling well", crying, hyperventilating, tingling sensation in whole body, and "fidgeting" with clothing/blankets with impaired awareness with no EEG correlate  - Neuropsychology evaluation 12/9; see note for details  "

## 2024-12-10 PROCEDURE — 95714 VEEG EA 12-26 HR UNMNTR: CPT

## 2024-12-10 PROCEDURE — 99499 UNLISTED E&M SERVICE: CPT | Mod: ,,, | Performed by: PHYSICIAN ASSISTANT

## 2024-12-10 PROCEDURE — 25000003 PHARM REV CODE 250

## 2024-12-10 PROCEDURE — A4216 STERILE WATER/SALINE, 10 ML: HCPCS

## 2024-12-10 PROCEDURE — 11000001 HC ACUTE MED/SURG PRIVATE ROOM

## 2024-12-10 PROCEDURE — 99233 SBSQ HOSP IP/OBS HIGH 50: CPT | Mod: FS,,, | Performed by: PSYCHIATRY & NEUROLOGY

## 2024-12-10 PROCEDURE — 25000003 PHARM REV CODE 250: Performed by: PHYSICIAN ASSISTANT

## 2024-12-10 PROCEDURE — 95720 EEG PHY/QHP EA INCR W/VEEG: CPT | Mod: ,,, | Performed by: STUDENT IN AN ORGANIZED HEALTH CARE EDUCATION/TRAINING PROGRAM

## 2024-12-10 RX ADMIN — Medication 10 ML: at 09:12

## 2024-12-10 RX ADMIN — TRAMADOL HYDROCHLORIDE 50 MG: 50 TABLET, COATED ORAL at 06:12

## 2024-12-10 RX ADMIN — DIPHENHYDRAMINE HYDROCHLORIDE 50 MG: 50 CAPSULE ORAL at 06:12

## 2024-12-10 NOTE — PROGRESS NOTES
"[facility] ([facility])  Neurology-Epilepsy  Progress Note    Patient Name: Lina Rutledge  MRN: [id number]  Admission Date: 12/6/2024  Hospital Length of Stay: 4 days  Code Status: Full Code   Attending Provider: Helder Valencia MD  Primary Care Physician: No, Primary Doctor   Principal Problem:Refractory epilepsy    Subjective:     Hospital Course:   12/6>12/7: Home Lamictal held at admission and Vimpat decreased to 100 mg BID. Patient had witnessed non-epileptic event this morning where she was repeating a phrase;  said this is one of her typical event types, there was no EEG correlate with this event. Plan for hold Vimpat beginning this evening and undergo sleep deprivation with meds overnight.   12/7>12/8: Successfully sleep deprived until 5:00AM. EEG normal without any further clinical events.   12/8>12/9: PB ~2026 for episode described as heart racing and "not feeling well" followed by crying, hyperventilating, tingling sensation in whole body, and "fidgeting" with clothing/blankets with impaired awareness with no EEG correlate. Neuropsychology evaluation today for nonepileptic events. EEG remains WNL. Continue with provoking measures to evaluate for concurrent epilepsy- repeat sleep deprivation tonight and activation medications 12/10 AM.  12/9>12/10: Successful sleep deprivation overnight, received activation medications this morning. EEG remains WNL. PB ~0808 for typical episode of staring associated with slurred speech, tingling sensation, and abdominal sensation with no EEG correlate.    See EEG reports for details.    Interval History: Successful sleep deprivation overnight, received activation medications this morning. This AM, patient lying in bed resting comfortably with mother at bedside. Patient endorses fatigue. Discussed event this morning of staring associated with slurred speech, tingling, and abdominal sensation which is typical. Discussed EEG findings and plan of care, answered " all questions.    Current Facility-Administered Medications   Medication Dose Route Frequency Provider Last Rate Last Admin    acetaminophen tablet 650 mg  650 mg Oral Q4H PRN Burke Isidro MD   650 mg at 12/09/24 0955    docusate sodium capsule 100 mg  100 mg Oral BID PRN Burke Isidro MD        LORazepam injection 2 mg  2 mg Intravenous Q5 Min PRN Burke Isidro MD        sodium chloride 0.9% flush 10 mL  10 mL Intravenous PRN Burke Isidro MD   10 mL at 12/09/24 2122     Continuous Infusions:    Review of Systems  Objective:     Vital Signs (Most Recent):  Temp: 97.6 °F (36.4 °C) (12/10/24 0718)  Pulse: 75 (12/10/24 0813)  Resp: 18 (12/10/24 0813)  BP: 116/60 (12/10/24 0813)  SpO2: 98 % (12/10/24 0813) Vital Signs (24h Range):  Temp:  [97.6 °F (36.4 °C)-98.1 °F (36.7 °C)] 97.6 °F (36.4 °C)  Pulse:  [67-91] 75  Resp:  [14-20] 18  SpO2:  [98 %-99 %] 98 %  BP: ()/(44-60) 116/60     Weight: 67.5 kg (148 lb 13 oz)  Body mass index is 27.22 kg/m².     Physical Exam  Vitals reviewed.   Constitutional:       General: She is not in acute distress.     Appearance: She is not diaphoretic.   HENT:      Head: Normocephalic and atraumatic.      Comments: EEG in place  Eyes:      General: No scleral icterus.        Right eye: No discharge.         Left eye: No discharge.      Extraocular Movements: Extraocular movements intact and EOM normal.      Conjunctiva/sclera: Conjunctivae normal.      Pupils: Pupils are equal, round, and reactive to light.   Cardiovascular:      Rate and Rhythm: Normal rate.   Pulmonary:      Effort: Pulmonary effort is normal. No respiratory distress.   Skin:     General: Skin is warm and dry.   Neurological:      General: No focal deficit present.      Mental Status: She is alert and oriented to person, place, and time. Mental status is at baseline.   Psychiatric:         Speech: Speech normal.         Behavior: Behavior is cooperative.            NEUROLOGICAL EXAMINATION:     MENTAL STATUS  "  Oriented to person, place, and time.   Attention: normal. Concentration: normal.   Speech: speech is normal   Level of consciousness: alert    CRANIAL NERVES     CN II   Visual fields full to confrontation.     CN III, IV, VI   Pupils are equal, round, and reactive to light.  Extraocular motions are normal.     CN V   Right corneal reflex: normal  Left corneal reflex: normal    CN VII   Facial expression full, symmetric.     CN VIII   Hearing: intact    CN XI   CN XI normal.     CN XII   CN XII normal.       Significant Labs: All pertinent lab results from the past 24 hours have been reviewed.    Significant Studies: I have reviewed all pertinent imaging results/findings within the past 24 hours.  Assessment and Plan:     * Refractory epilepsy  Mesial temporal sclerosis  34F PMHx of migraines and seizure disorder maintained on Lacosamide 200 mg BID and Lamotrigine  mg qhs who was referred to EMU by Dr. Elam for event capture and characterization as well as consideration of surgical evaluation due to reports of increased event frequency since June 2024. Typical events described as aura with hearing a buzzing or extremely loud train noise, squiggles in vision, rising in stomach followed by "staring" which occasionally progresses to GTC.     - Continuous vEEG   - Hold home Lamotrigine on admission, Lacosamide decreased to 100 mg BID on admit > held 12/7 PM  - LCM 1.1, LTG 4.2  - Activation procedures per protocol- medication adjustments as above, sleep deprivation 12/9>12/10, activation medications 12/10 AM  - Neuropsychology evaluation 12/9 for nonepileptic events  - IV Ativan PRN for GTC greater than 5 min - please call epilepsy on call   - Seizure precautions, suction and oxygen at bedside  - Fall precautions, side rail padding in place  - Visi monitoring with continuous pulse oximetry   - Telemetry  - Plan for MRI Epilepsy Protocol as outpatient (last MRI in 2020)    Conversion disorder  - Per chart " "review, patient had 2 typical events with no EEG correlate during EMU admission in [place] in 6/2020  - Additional EMU admission in [place] 9/2020 with normal EEG, no events  - Typical event 12/7 AM for crying, hyperventilating, repeating phrase "I don't want to go" followed by shaking of BUE and unresponsiveness with no EEG correlate  - Additional event 12/8 PM for heart racing, "not feeling well", crying, hyperventilating, tingling sensation in whole body, and "fidgeting" with clothing/blankets with impaired awareness with no EEG correlate  - Neuropsychology evaluation 12/9; see note for details    Migraine without aura and without status migrainosus, not intractable  - Chronic, uses Rizatriptan PRN as outpatient  - Analgesics PRN    Mesial temporal sclerosis  See "Refractory epilepsy"        VTE Risk Mitigation (From admission, onward)           Ordered     IP VTE LOW RISK PATIENT  Once         12/06/24 1133     Place sequential compression device  Until discontinued         12/06/24 1133                    Mitzi Connell PA-C  Neurology-Epilepsy  [facility] - EMU  Staff: Dr. Valencia  "

## 2024-12-10 NOTE — ASSESSMENT & PLAN NOTE
"- Per chart review, patient had 2 typical events with no EEG correlate during EMU admission in [place] in 6/2020  - Additional EMU admission in [place] 9/2020 with normal EEG, no events  - Typical event 12/7 AM for crying, hyperventilating, repeating phrase "I don't want to go" followed by shaking of BUE and unresponsiveness with no EEG correlate  - Additional event 12/8 PM for heart racing, "not feeling well", crying, hyperventilating, tingling sensation in whole body, and "fidgeting" with clothing/blankets with impaired awareness with no EEG correlate  - Neuropsychology evaluation 12/9; see note for details  "

## 2024-12-10 NOTE — PLAN OF CARE
Problem: Adult Inpatient Plan of Care  Goal: Plan of Care Review  Outcome: Progressing  Goal: Patient-Specific Goal (Individualized)  Outcome: Progressing  Goal: Absence of Hospital-Acquired Illness or Injury  Outcome: Progressing  Goal: Optimal Comfort and Wellbeing  Outcome: Progressing  Goal: Readiness for Transition of Care  Outcome: Progressing     Problem: Fall Injury Risk  Goal: Absence of Fall and Fall-Related Injury  Outcome: Progressing     Problem: Seizure, Active Management  Goal: Absence of Seizure/Seizure-Related Injury  Outcome: Progressing               POC reviewed and updated with the patient/mother. Questions regarding POC were encouraged and addressed with the patient.  VSS, see flow-sheets. Tele and continuous pulse ox maintained per provider's order. Continuous EEG in place. NAEON. Patient is AO X 4 at this time. Successful sleep deprivation until 0300 per provider's orders. Patient to be awaken at 0700 and is to remain awake for the day. Verbalized understanding.  Seizure, fall, and safety precautions maintained, no signs of injury noted during shift.  Upon exiting room, patient's bed locked in low position, side rails up x 4, bed alarm refused, with call light within reach. Instructed patient/ mother to call staff for assistance, verbalized understanding.  No acute signs of distress noted at this time.

## 2024-12-10 NOTE — ASSESSMENT & PLAN NOTE
"Mesial temporal sclerosis  34F PMHx of migraines and seizure disorder maintained on Lacosamide 200 mg BID and Lamotrigine  mg qhs who was referred to EMU by Dr. Elam for event capture and characterization as well as consideration of surgical evaluation due to reports of increased event frequency since June 2024. Typical events described as aura with hearing a buzzing or extremely loud train noise, squiggles in vision, rising in stomach followed by "staring" which occasionally progresses to GTC.     - Continuous vEEG   - Hold home Lamotrigine on admission, Lacosamide decreased to 100 mg BID on admit > held 12/7 PM  - LCM 1.1, LTG 4.2  - Activation procedures per protocol- medication adjustments as above, sleep deprivation 12/9>12/10, activation medications 12/10 AM  - Neuropsychology evaluation 12/9 for nonepileptic events  - IV Ativan PRN for GTC greater than 5 min - please call epilepsy on call   - Seizure precautions, suction and oxygen at bedside  - Fall precautions, side rail padding in place  - Visi monitoring with continuous pulse oximetry   - Telemetry  - Plan for MRI Epilepsy Protocol as outpatient (last MRI in 2020)  "

## 2024-12-10 NOTE — PROCEDURES
EEG REPORT      Lina Rutledge  [id number]  1990    DATE OF SERVICE: 12/9/2024    [facility] -4    METHODOLOGY      Extended electroencephalographic recording is made while the patient is ambulatory and continuing normal daily activities.  Electrodes are placed according to the International 10-20 placement system and included T1 and T2 electrode placement.  Twenty four (24) channels of digital signal (sampling rate of 512/sec) was simultaneously recorded from the scalp including EKG and eye monitors.  Recording band pass was 0.1 to 100 hz and all data was stored digitally on the recorder.  The patient is instructed to press an event button when clinical symptoms occur and write the symptoms into a diary. Activation procedures which include photic stimulation, hyperventilation and instructing patients to perform simple task are done in selected patients.        The EEG is displayed on a monitor screen and can be reformatted into different montages for evaluation.  The entire recoding is submitted for computer assisted analysis to detect spike and electrographic seizure activity.  The entire recording is visually reviewed and the times identified by computer analysis as being spikes or seizures are reviewed again.  Compresses spectral analysis (CSA) is also performed on the activity recorded from each individual channel.  This is displayed as a power display of frequencies from 0 to 30 Hz over time.   The CSA analysis is done and displayed continuously.  This is reviewed for asymmetries in power between homologous areas of the scalp and for presence of changes in power which canbe seen when seizures occur.  Sections of suspected abnormalities on the CSA is then compared with the original EEG recording.   [employer] software was also utilized in the review of this study.  This software suite analyzes the EEG recording in multiple domains.  Coherence and rhythmicity is computed to identify EEG sections which may  contain organized seizures.  Each channel undergoes analysis to detect presence of spike and sharp waves which have special and morphological characteristic of epileptic activity.  The routine EEG recording is converted from spacial into frequency domain.  This is then displayed comparing homologous areas to identify areas of significant asymmetry.  Algorithm to identify non-cortically generated artifact is used to separate eye movement, EMG and other artifact from the EEG     Recording Times  Start on 12/9/2024  Stop on 12/10/2024    A total of 23:57:34 hours of EEG was recorded.      EEG FINDINGS:  Background activity:   The background rhythm was characterized by alpha and anterior dominant beta activity with a 10Hz posterior dominant alpha rhythm at 30-70 microvolts.   Symmetry and continuity: the background was continuous and symmetric     Sleep:   Normal sleep transients including sleep spindles, K complexes, vertex waves and POSTS were seen.    Activation procedures:   NA    Abnormal activity:   No epileptiform discharges, periodic discharges, lateralized rhythmic delta activity or electrographic seizures were seen.    IMPRESSION:   Normal one day video EEG.      Helder Valencia MD  Neurology-Epilepsy.  [facility].

## 2024-12-10 NOTE — SUBJECTIVE & OBJECTIVE
Interval History: Successful sleep deprivation overnight, received activation medications this morning. This AM, patient lying in bed resting comfortably with mother at bedside. Patient endorses fatigue. Discussed event this morning of staring associated with slurred speech, tingling, and abdominal sensation which is typical. Discussed EEG findings and plan of care, answered all questions.    Current Facility-Administered Medications   Medication Dose Route Frequency Provider Last Rate Last Admin    acetaminophen tablet 650 mg  650 mg Oral Q4H PRN Burke Isidro MD   650 mg at 12/09/24 0955    docusate sodium capsule 100 mg  100 mg Oral BID PRN Burke Isidro MD        LORazepam injection 2 mg  2 mg Intravenous Q5 Min PRN Burke Isidro MD        sodium chloride 0.9% flush 10 mL  10 mL Intravenous PRN Burke Isidro MD   10 mL at 12/09/24 2122     Continuous Infusions:    Review of Systems  Objective:     Vital Signs (Most Recent):  Temp: 97.6 °F (36.4 °C) (12/10/24 0718)  Pulse: 75 (12/10/24 0813)  Resp: 18 (12/10/24 0813)  BP: 116/60 (12/10/24 0813)  SpO2: 98 % (12/10/24 0813) Vital Signs (24h Range):  Temp:  [97.6 °F (36.4 °C)-98.1 °F (36.7 °C)] 97.6 °F (36.4 °C)  Pulse:  [67-91] 75  Resp:  [14-20] 18  SpO2:  [98 %-99 %] 98 %  BP: ()/(44-60) 116/60     Weight: 67.5 kg (148 lb 13 oz)  Body mass index is 27.22 kg/m².     Physical Exam  Vitals reviewed.   Constitutional:       General: She is not in acute distress.     Appearance: She is not diaphoretic.   HENT:      Head: Normocephalic and atraumatic.      Comments: EEG in place  Eyes:      General: No scleral icterus.        Right eye: No discharge.         Left eye: No discharge.      Extraocular Movements: Extraocular movements intact and EOM normal.      Conjunctiva/sclera: Conjunctivae normal.      Pupils: Pupils are equal, round, and reactive to light.   Cardiovascular:      Rate and Rhythm: Normal rate.   Pulmonary:      Effort: Pulmonary effort is normal.  No respiratory distress.   Skin:     General: Skin is warm and dry.   Neurological:      General: No focal deficit present.      Mental Status: She is alert and oriented to person, place, and time. Mental status is at baseline.   Psychiatric:         Speech: Speech normal.         Behavior: Behavior is cooperative.            NEUROLOGICAL EXAMINATION:     MENTAL STATUS   Oriented to person, place, and time.   Attention: normal. Concentration: normal.   Speech: speech is normal   Level of consciousness: alert    CRANIAL NERVES     CN II   Visual fields full to confrontation.     CN III, IV, VI   Pupils are equal, round, and reactive to light.  Extraocular motions are normal.     CN V   Right corneal reflex: normal  Left corneal reflex: normal    CN VII   Facial expression full, symmetric.     CN VIII   Hearing: intact    CN XI   CN XI normal.     CN XII   CN XII normal.       Significant Labs: All pertinent lab results from the past 24 hours have been reviewed.    Significant Studies: I have reviewed all pertinent imaging results/findings within the past 24 hours.

## 2024-12-10 NOTE — PLAN OF CARE
[facility] (Hospital)  Discharge Reassessment    Primary Care Provider: No, Primary Doctor    Expected Discharge Date: 12/11/2024    Reassessment (most recent)       Discharge Reassessment - 12/10/24 1538          Discharge Reassessment    Assessment Type Discharge Planning Reassessment (P)      Did the patient's condition or plan change since previous assessment? No (P)      Discharge Plan discussed with: Patient (P)      Communicated STEWART with patient/caregiver Yes (P)      Discharge Plan A Home with family (P)      Discharge Plan B Home with family (P)      DME Needed Upon Discharge  none (P)      Transition of Care Barriers None (P)      Why the patient remains in the hospital Requires continued medical care (P)         Post-Acute Status    Hospital Resources/Appts/Education Provided Provided patient/caregiver with written discharge plan information (P)      Discharge Delays None known at this time (P)                    Discharge Plan A and Plan B have been determined by review of patient's clinical status, future medical and therapeutic needs, and coverage/benefits for post-acute care in coordination with multidisciplinary team members.    Pt not medically ready for dc at this time. At the time of dc pt will dc home with family providing transportation.     Cony Colin [facility]  Case Management  Ext. [phone]

## 2024-12-10 NOTE — NURSING
At 8:08am family member push alarm for seizure. Pt staring off and slurred speech. States that arms and legs tingling. VS stable throughout. Ending at 8:12am - notified EMU team. No new orders given. Will continue to monitor

## 2024-12-11 VITALS
WEIGHT: 148.81 LBS | RESPIRATION RATE: 18 BRPM | OXYGEN SATURATION: 98 % | SYSTOLIC BLOOD PRESSURE: 98 MMHG | DIASTOLIC BLOOD PRESSURE: 54 MMHG | TEMPERATURE: 98 F | HEART RATE: 65 BPM | BODY MASS INDEX: 27.38 KG/M2 | HEIGHT: 62 IN

## 2024-12-11 PROBLEM — R29.818 TRANSIENT NEUROLOGICAL SYMPTOMS: Status: ACTIVE | Noted: 2020-02-04

## 2024-12-11 PROBLEM — G40.919 REFRACTORY EPILEPSY: Status: ACTIVE | Noted: 2024-12-11

## 2024-12-11 PROCEDURE — 99239 HOSP IP/OBS DSCHRG MGMT >30: CPT | Mod: FS,,, | Performed by: STUDENT IN AN ORGANIZED HEALTH CARE EDUCATION/TRAINING PROGRAM

## 2024-12-11 PROCEDURE — 99499 UNLISTED E&M SERVICE: CPT | Mod: ,,, | Performed by: PHYSICIAN ASSISTANT

## 2024-12-11 PROCEDURE — 25000003 PHARM REV CODE 250: Performed by: PHYSICIAN ASSISTANT

## 2024-12-11 PROCEDURE — 95718 EEG PHYS/QHP 2-12 HR W/VEEG: CPT | Mod: ,,, | Performed by: STUDENT IN AN ORGANIZED HEALTH CARE EDUCATION/TRAINING PROGRAM

## 2024-12-11 RX ORDER — LACOSAMIDE 100 MG/1
200 TABLET ORAL EVERY 12 HOURS
Status: DISCONTINUED | OUTPATIENT
Start: 2024-12-11 | End: 2024-12-11 | Stop reason: HOSPADM

## 2024-12-11 RX ADMIN — LACOSAMIDE 100 MG: 100 TABLET, FILM COATED ORAL at 11:12

## 2024-12-11 NOTE — ASSESSMENT & PLAN NOTE
"34F PMHx of migraines and suspected seizure disorder maintained on Lacosamide 200 mg BID and Lamotrigine  mg qhs who was referred to EMU by Dr. Elam for event capture and characterization as well as consideration of surgical evaluation due to reports of increased event frequency since June 2024. Typical events described as aura with hearing a buzzing or extremely loud train noise, squiggles in vision, rising in stomach followed by "staring" which occasionally progresses to GTC.     - Hold home Lamotrigine on admission, Lacosamide decreased to 100 mg BID on admit > held 12/7 PM  - LCM 1.1 and LTG 4.2 c/f medication noncompliance  - EEG WNL throughout admission off AEDs with repeat provoking measures  - Multiple typical events with no EEG correlate c/w conversion disorder  - Neuropsychology evaluation completed 12/9  - No evidence to suggest concurrent diagnosis of epilepsy  - Discharged home in stable condition on resumed medications after discussion with patient's established neurologist, Dr. Elam  - Recommend repeating MRI with Epilepsy Protocol to evaluate MTS noted on previous MRI in 2020; MRI scheduled for 1/11/2025  - Scheduled follow up with Dr. Elam  "

## 2024-12-11 NOTE — PLAN OF CARE
CHW met with patient/family at bedside. Patient experience rounding completed and reviewed the following.     Do you know your discharge plan? Yes Home    If yes, what is the plan? (Home, Home Health, Rehab, SNF, LTAC, or Other)     Have you discussed your needs and preferences with your SW/CM? Yes     If you are discharging home, do you have help at home? Yes   Patient has help at home.    Do you think you will need help additional at home at discharge?   No   Patient has no need for additional help.    Do you currently have difficulty keeping up with bills, affording medicine or buying food?  No  Patient has help with bills, food, and medicine.    Assigned SW/CM notified of any patient/family needs or concerns. Appropriate resources provided to address patient's needs.   Cony WU, RSW  Case Management  [phone]

## 2024-12-11 NOTE — PLAN OF CARE
Problem: Adult Inpatient Plan of Care  Goal: Plan of Care Review  Outcome: Progressing  Goal: Patient-Specific Goal (Individualized)  Outcome: Progressing  Goal: Absence of Hospital-Acquired Illness or Injury  Outcome: Progressing  Goal: Optimal Comfort and Wellbeing  Outcome: Progressing  Goal: Readiness for Transition of Care  Outcome: Progressing     Problem: Fall Injury Risk  Goal: Absence of Fall and Fall-Related Injury  Outcome: Progressing     Problem: Seizure, Active Management  Goal: Absence of Seizure/Seizure-Related Injury  Outcome: Progressing           POC reviewed and updated with the patient/mother. Questions regarding POC were encouraged and addressed with the patient.  VSS, see flow-sheets. Tele and continuous pulse ox maintained per provider's order. Continuous EEG in place. NAEON. Patient is AO X 4 at this time.  Seizure, fall, and safety precautions maintained, no signs of injury noted during shift.  Upon exiting room, patient's bed locked in low position, side rails up x 4, bed alarm set, with call light within reach. Instructed patient/ mother to call staff for assistance, verbalized understanding.  No acute signs of distress noted at this time.

## 2024-12-11 NOTE — PROCEDURES
VIDEO ELECTROENCEPHALOGRAM  REPORT    DATE OF SERVICE:12/11/24  EEG NUMBER: [id number]  REQUESTED BY:  Dr Elam  LOCATION OF SERVICE:  [facility]    METHODOLOGY   Electroencephalographic (EEG) recording is with electrodes placed according to the International 10-20 placement system.  Thirty two (32) channels of digital signal (sampling rate of 512/sec) including T1 and T2 was simultaneously recorded from the scalp and may include  EKG, EMG, and/or eye monitors.  Recording band pass was 0.1 to 512 hz.  Digital video recording of the patient is simultaneously recorded with the EEG.  The patient is instructed report clinical symptoms which may occur during the recording session.  EEG and video recording is stored and archived in digital format.  Activation procedures which include photic stimulation, hyperventilation and instructing patients to perform simple task are done in selected patients.   The EEG is displayed on a monitor screen and can be reviewed using different montages.  Computer assisted analysis is employed to detect spike and electrographic seizure activity.   The entire record is submitted for computer analysis.  The entire recording is visually reviewed and the times identified by computer analysis as being spikes or seizures are reviewed again.  Compresses spectral analysis (CSA) is also performed on the activity recorded from each individual channel.  This is displayed as a power display of frequencies from 0 to 30 Hz over time.   The CSA is reviewed looking for asymmetries in power between homologous areas of the scalp and then compared with the original EEG recording.     [employer] software was also utilized in the review of this study.  This software suite analyzes the EEG recording in multiple domains.  Coherence and rhythmicity is computed to identify EEG sections which may contain organized seizures.  Each channel undergoes analysis to detect presence of spike and sharp waves which have special and  morphological characteristic of epileptic activity.  The routine EEG recording is converted from spacial into frequency domain.  This is then displayed comparing homologous areas to identify areas of significant asymmetry.  Algorithm to identify non-cortically generated artifact is used to separate eye movement, EMG and other artifact from the EEG.      ELECTROENCEPHALOGRAM:    RECORDING TIMES:  Start on 12/10/24 at 07:00  Stop on 12/11/24 at 07:00 - 24 hours recorded  Start 12/11/24 at 07:00 for 3 hours 38 mins    Indication: 34 y.o. female with recurrent seizure like events refractory to medication referred for spell characterization.     State of Consciousness:   Awake and asleep    Background:   The background is well organized, symmetric and continuous.  There is a normal anterior to posterior gradient consisting of 5-10 mcV amplitude beta activity in the frontal region and well defined alpha activity in the posterior region.   At maximum alertness, there is a well developed 9-10 Hz posterior dominant rhythm that is symmetric and reactive to eye opening and closure noted.    Sleep:   Transition into stage 1 sleep is characterized by attenuation of the posterior dominant rhythm, bilateral theta activity, and vertex waves.  There is also transition into stage II sleep with symmetric vertex waves, sleep spindles, and k complexes noted.     Epileptiform Abnormalities  None    Seizures/Events:   One clinical event at 08:07.  Patient reports she hears a ringing noise during this time.  No EEG correlate is seen.    EKG:   Regular rate and rhythm on single lead EKG    Activating procedures:   - Hyperventilation: no abnormalities noted - patient reported tingling in her hands after HV.  - Photic stimulation: symmetric photic driving, no epileptiform discharges elicited     Impression:   This is a normal awake and sleep EEG.  There are no epileptiform discharges or focal abnormalities noted.  One clinical event of  ringing in the ears is recorded without EEG correlate consistent with a nonepileptic event.        Lindy Toledo MD  [facility]   Department of Neurology/Epilepsy

## 2024-12-11 NOTE — DISCHARGE SUMMARY
Rudy Garcia  EMU ([facility])  Neurology-Epilepsy  Discharge Summary      Patient Name: Lina Rutledge  MRN: [id number]  Admission Date: 12/6/2024  Hospital Length of Stay: 5 days  Discharge Date and Time: 12/11/2024 12:52 PM  Attending Physician: Lindy Toledo MD  Discharging Provider: Mitzi Connell PA-C  Primary Care Physician: Dianelys, Primary Doctor    HPI:   Ms. Lina Rutledge is a 34 y.o. female with refractory epilepsy who was referred by Dr. Elam for event capture/characterization and surgical planning.     Seizure Type: unknown  Seizure Etiology: unknown - suspect R MTS  Home AEDs: Vimpat 200 mg BID, Lamictal  mg po qhs  Last AEDs Taken Prior to Admission: 12/5/2024 PM    The patient is accompanied by family who contribute to the history. This patient has three types of seizure as described below. The patient reports having seizures for years. The patient reports to have worse seizure control. The seizure frequency is variable. The last seizure was Tuesday evening (12/3/2024) . The patient does not report side effects from seizure medication.     Semiology:   1st type: tapping/picking her right hand then stares off; can progress to GTC  - Aura: Hearing a buzzing or extremely loud train noise; squiggles in vision; rising in stomach  - Triggers: car headlights at night  - Frequency: variable, increased in last few months  - Duration: ~30 seconds     2nd type:   electrical burning smell  - Triggers: car headlights at night  - Frequency: once per month  - Duration: ~30 seconds    Handedness: right  Seizure Onset Age: childhood  Seizure/ Epilepsy Risk Factors: childhood seizure  Birth/Developmental History: normal birth history and Normal developmental history  Seizure Triggers/ Provoking Features: stress and alcohol, abrupt temperature changes  Previous Seizure Medications: levetiracetam (Keppra, LEV), perampanel (Fycompa, FCP), and valproic acid (Depakote, VPA)  Recent Med Changes: None  Other  Treatments: None  Prior Episodes of Status: No  Psychiatric/Behavioral Comorbitidies: None documented  Surgical Candidacy: Undergoing eval    Prior Studies:  EEG :   3/27/2020 - This is a normal EEG for age in the awake and drowsy states.   12/5/2024 - This is a normal EEG during wakefulness and sleep. No potentially epileptiform activity was seen.   vEEG/ EMU evaluation:   6/29/2020-7/1/2020 in [place] - The patient had two events during last two nights described as a nightmare, followed by awakening with SOB (typical semiology). The patient was also sleep deprived for one night to exacerbate epileptiform activity. The EEG reports from the first event revealed no ictal events, epileptiform discharges or paroxysmal abnormalities.   9/25/2020-9/30/2020 in [place] -  No abnormal electrical activity was noted on EEG despite weaning lamotrigine to 100 mg once and one night of sleep deprivation.   MRI of brain:   5/28/2020 MRI w/wo - Findings mostly consistent with right mesial temporal sclerosis with decreased volume of the CNA, loss of anatomical features and hyperintensity in FLAIR sequence indicating sclerotic changes   11/26/2024 fMRI - Findings compatible with left hemisphere language dominance with frontotemporal concordance.   AED levels:  11/4/2024 lacosamide level 4.4; lamotrigine level 6.5  CT/CTA Scan:  None  PET Scan: 12/6/2024 - Minimal asymmetric hypometabolism within the right hippocampal region as compared to the left.   Neuropsychological Evaluation: None  DEXA Scan: None  Other studies: None    * No surgery found *     Indwelling Lines/Drains at time of discharge:   Lines/Drains/Airways       None                 Hospital Course:   12/6>12/7: Home Lamictal held at admission and Vimpat decreased to 100 mg BID. Patient had witnessed non-epileptic event this morning where she was repeating a phrase;  said this is one of her typical event types, there was no EEG correlate with this event. Plan  "for hold Vimpat beginning this evening and undergo sleep deprivation with meds overnight.   12/7>12/8: Successfully sleep deprived until 5:00AM. EEG normal without any further clinical events.   12/8>12/9: PB ~2026 for episode described as heart racing and "not feeling well" followed by crying, hyperventilating, tingling sensation in whole body, and "fidgeting" with clothing/blankets with impaired awareness with no EEG correlate. Neuropsychology evaluation today for nonepileptic events. EEG remains WNL. Continue with provoking measures to evaluate for concurrent epilepsy- repeat sleep deprivation tonight and activation medications 12/10 AM.  12/9>12/10: Successful sleep deprivation overnight, received activation medications this morning. EEG remains WNL. PB ~0808 for typical episode of staring associated with slurred speech, tingling sensation, and abdominal sensation with no EEG correlate. HV/PS today.  12/10>12/11: NAEON. EEG WNL throughout admission off AEDs with repeat provoking measures. Multiple typical events with no EEG correlate c/w conversion disorder. No evidence to suggest concurrent diagnosis of epilepsy. Discharged home in stable condition on resumed medications after discussion with patient's established neurologist, Dr. Elam. Recommend repeating MRI with Epilepsy Protocol to evaluate for MTS noted on previous MRI in 2020. Patient has scheduled follow up with Dr. Elam.    See EEG reports for details.    Goals of Care Treatment Preferences:  Code Status: Full Code      Consults:   Consults (From admission, onward)          Status Ordering Provider     Inpatient consult to Neuropsychology  Once        Provider:  (Not yet assigned)    Completed PINA, LENA            Significant Labs: All pertinent lab results from the past 24 hours have been reviewed.    Significant Studies: I have reviewed all pertinent imaging results/findings within the past 24 hours.    Pending Diagnostic Studies:       None " "         Final Active Diagnoses:    Diagnosis Date Noted POA    PRINCIPAL PROBLEM:  Transient neurological symptoms [R29.818] 02/04/2020 Yes    Conversion disorder [F44.9] 12/07/2024 No    Migraine without aura and without status migrainosus, not intractable [G43.009] 03/09/2023 Yes    Mesial temporal sclerosis [G93.81]  Yes      Problems Resolved During this Admission:       Neuro  * Transient neurological symptoms  34F PMHx of migraines and suspected seizure disorder maintained on Lacosamide 200 mg BID and Lamotrigine  mg qhs who was referred to EMU by Dr. Elam for event capture and characterization as well as consideration of surgical evaluation due to reports of increased event frequency since June 2024. Typical events described as aura with hearing a buzzing or extremely loud train noise, squiggles in vision, rising in stomach followed by "staring" which occasionally progresses to GTC.     - Hold home Lamotrigine on admission, Lacosamide decreased to 100 mg BID on admit > held 12/7 PM  - LCM 1.1 and LTG 4.2 c/f medication noncompliance  - EEG WNL throughout admission off AEDs with repeat provoking measures  - Multiple typical events with no EEG correlate c/w conversion disorder  - Neuropsychology evaluation completed 12/9  - No evidence to suggest concurrent diagnosis of epilepsy  - Discharged home in stable condition on resumed medications after discussion with patient's established neurologist, Dr. Elam  - Recommend repeating MRI with Epilepsy Protocol to evaluate MTS noted on previous MRI in 2020; MRI scheduled for 1/11/2025  - Scheduled follow up with Dr. Elam    Migraine without aura and without status migrainosus, not intractable  - Chronic, uses Rizatriptan PRN as outpatient  - Analgesics PRN    Mesial temporal sclerosis  - Noted on MRI Brain in 2020  - Recommend repeating MRI with Epilepsy Protocol to evaluate; scheduled for 1/11/2025    Psychiatric  Conversion disorder  Hx of per chart " "review  EMU admission in [place] in 6/2020 with 2 typical events with no EEG correlate  Repeat EMU admission in [place] 9/2020 with normal EEG, no events  - Typical event 12/7 AM for crying, hyperventilating, repeating phrase "I don't want to go" followed by shaking of BUE and unresponsiveness with no EEG correlate  - Additional event 12/8 PM for heart racing, "not feeling well", crying, hyperventilating, tingling sensation in whole body, and "fidgeting" with clothing/blankets with impaired awareness with no EEG correlate  - Additional typical event 12/10 AM staring associated with slurred speech, tingling sensation, and abdominal sensation with no EEG correlate  - Neuropsychology evaluation completed 12/9; see note for details        Discharged Condition: stable    Disposition: Home or Self Care    Patient Instructions:      MRI Brain Epilepsy Without Contrast   Standing Status: Future Standing Exp. Date: 12/11/25     Order Specific Question Answer Comments   Does the patient have or ever had a pacemaker or a defibrillator (Note: Some facilities may not be able to schedule an MRI for patients with pacemakers and defibrillators. You should contact your local radiology dept to determine if this is the case.)? No    Does the patient have an aneurysm or surgical clip, pump, nerve/brain stimulator, middle/inner ear prosthesis, or other metal implant or foreign object (bullet, shrapnel)? If they have a card related to their implant, ask them to bring it. Issues related to the implant may cause the MRI to be delayed. No    Will the patient require po anxiolysis or sedation? No    May the Radiologist modify the order per protocol to meet the clinical needs of the patient? Yes    Does the patient have on a skin patch for medication with aluminized backing? No        Medications:  Reconciled Home Medications:      Medication List        CONTINUE taking these medications      lacosamide 200 mg Tab tablet  Commonly known " as: VIMPAT  Take 1 tablet (200 mg total) by mouth every 12 (twelve) hours.     lamotrigine  mg XR tablet  Commonly known as: LaMICtal XR  Take 1 tablet (300 mg total) by mouth once daily.     rizatriptan 5 MG disintegrating tablet  Commonly known as: MAXALT-MLT  Take 1 tablet (5 mg total) by mouth as needed for Migraine. May repeat in 2 hours if needed     VALTOCO 15 mg/2 spray (7.5/0.1mL x 2) Spry  Generic drug: diazePAM  SPRAY 1 SPRAY IN THE NOSE AS NEEDED SEIZURE            Time spent on the discharge of patient: 60 minutes    Mitzi Connell PA-C  Neurology-Epilepsy  [facility] - EMU  Staff: Dr. Toledo

## 2024-12-11 NOTE — ASSESSMENT & PLAN NOTE
"Hx of per chart review  EMU admission in [place] in 6/2020 with 2 typical events with no EEG correlate  Repeat EMU admission in [place] 9/2020 with normal EEG, no events  - Typical event 12/7 AM for crying, hyperventilating, repeating phrase "I don't want to go" followed by shaking of BUE and unresponsiveness with no EEG correlate  - Additional event 12/8 PM for heart racing, "not feeling well", crying, hyperventilating, tingling sensation in whole body, and "fidgeting" with clothing/blankets with impaired awareness with no EEG correlate  - Additional typical event 12/10 AM staring associated with slurred speech, tingling sensation, and abdominal sensation with no EEG correlate  - Neuropsychology evaluation completed 12/9; see note for details  "

## 2024-12-11 NOTE — NURSING
Reviewed discharge orders and medications with patient and mother. Removed PIV without any complications. Patient able to ambulate to be discharge with mother and belongings.

## 2024-12-11 NOTE — PLAN OF CARE
Rudy Garcia - Neurosurgery (Hospital)  Discharge Final Note    Primary Care Provider: No, Primary Doctor    Expected Discharge Date: 12/11/2024    Final Discharge Note (most recent)       Final Note - 12/11/24 1454          Final Note    Assessment Type Final Discharge Note (P)      Anticipated Discharge Disposition Home or Self Care (P)      Hospital Resources/Appts/Education Provided Provided patient/caregiver with written discharge plan information (P)         Post-Acute Status    Discharge Delays None known at this time (P)                    Important Message from Medicare  Important Message from Medicare regarding Discharge Appeal Rights: Given to patient/caregiver, Explained to patient/caregiver, Signed/date by patient/caregiver     Date IMM was signed: 12/11/24  Time IMM was signed: 0940    Future Appointments   Date Time Provider Department Center   12/23/2024 10:00 AM Destin Wadsworth, PhD ON NEURPSY  Medical C   1/11/2025  1:00 PM [id number]   1/15/2025  8:30 AM NEUROPSYCH TESTING, [facility] ON NEURPSY  [facility] C   1/17/2025 10:40 AM Prashanth Elam MD [facility] NEURO [place]     Discharge Plan A and Plan B have been determined by review of patient's clinical status, future medical and therapeutic needs, and coverage/benefits for post-acute care in coordination with multidisciplinary team members.    Pt to dc home with transportation provided by pts family. Pt with no needs from  at this time.'    Cony Colin [facility]  Case management  Ext. [phone]

## 2024-12-11 NOTE — ASSESSMENT & PLAN NOTE
- Noted on MRI Brain in 2020  - Recommend repeating MRI with Epilepsy Protocol to evaluate; scheduled for 1/11/2025

## 2024-12-11 NOTE — NURSING
Pt stated that she only wanted to take vimpat 100mg because 200mg makes her nausea when she has skipped dose.  Wasted 100mg of vimpat with Oriana Lopez.

## 2024-12-20 NOTE — PROGRESS NOTES
"NEUROPSYCHOLOGY CONSULT (TELEHEALTH)   Referral Information  Name: Lina Rutledge  MRN: [id number]  : 1990  Age: 34 y.o.  Race: White  Gender: female  Referring Provider: Prashanth Elam MD  Referral Diagnoses: G40.219 (ICD-10-CM) - Partial symptomatic epilepsy with complex partial seizures, intractable, without status epilepticus   [place]  Date Conducted: 2024    Telemedicine:   The patient location is: [place]  The provider location is: [place]  Total time spent with patient: 50 minutes   The chief complaint leading to consultation/medical necessity is: Epilepsy pre-surgical evaluation  Visit type: Virtual visit with synchronous audio and video  Each patient to whom I provide medical services by telemedicine is: (1) informed of the relationship between the physician and patient and the respective role of any other health care provider with respect to management of the patient; and (2) notified that they may decline to receive medical services by telemedicine and may withdraw from such care at any time.     Consent/Emergency Plan: The patient expressed an understanding of the purpose of the evaluation and consented to all procedures, including providing consent for Aryan Jackson, Ph.D., a clinical neuropsychology fellow under the supervision of Destin Wadsworth, Ph.D., to be involved in her care. We discussed the limits of confidentiality and discussed an emergency plan. Ms. Rutledge additionally provided consent to speak with her mother, Sangeeta, who was present during the clinical interview.    SUMMARY/TREATMENT PLAN   Summary of History:  Ms. Rutledge is a 34 y.o., right-handed, White, woman with 16 years of formal education. She was referred by her neurology team in the context of epilepsy pre-surgical evaluation for intractable epilepsy. Medical history is otherwise unremarkable. She had an EMU stay -2024 with continuous EEG captured "multiple typical events with no EEG correlate " "c/w conversion disorder." Most-recent neurologic exam completed on 11/04/2024 was unremarkable. Most-recent brain PET CT completed on 12/06/2024 documented "minimal asymmetric hypometabolism within the right hippocampal region as compared to the left." Brain fMRI completed on 11/26/2024 documented "left hemisphere language dominance with frontotemporal concordance." Most-recent laboratory studies drawn on 12/06/2024 were non-contributory. Brain structural MRI from 05/20/2020 documented possible right mesial temporal sclerosis. She is scheduled for updated brain MRI on 01/11/2025.     During interview, Ms. Rutledge reported the gradual onset and progressive course of cognitive concerns beginning in 2022. Specifically, she characterized difficulties with attention/concentration, processing speed, word-finding, and recall for recent information. Ms. Rutledge's  started managing the finances that were her responsibility in June 2024 due to Ms. Rutledge forgetting to pay bills. She is independent in all other activities of daily living, though she does receive reminders to take medications and for medical appointments from her  and daughter. Ms. Rutledge reported that she stopped working in 2019 after being diagnosed with seizure disorder and that she has been on disability for seizures since 2020.     Emotionally, Ms. Rutledge reported anxiety symptoms have been present since 2021 and have worsened over time. She also reports variable sleep due to anxiety- or worry-related thoughts that keep her awake. Additionally, she described having events where she starts crying and has shortness of breath; typically occurring once every 10-14 days and usually at night. She states these events are like a panic attack but does not believe that is what's happening. Ms. Rutledge was seen by neuropsychology during her EMU in 12/2024 for PNES and was encouraged to establish care with a mental health provider for treatment " at that time.     Ms. Rutledge is currently undergoing pre-surgical work-up for epilepsy. Given reported cognitive changes that have worsened overtime and because she is a surgical candidate, neuropsychological testing is indicated to assist in treatment planning.     Ms. Rutledge is scheduled to complete a neuropsychological evaluation on 01/15/2025 at 8:30am.    Problem List Items Addressed This Visit          Neuro    Partial symptomatic epilepsy with complex partial seizures, intractable, without status epilepticus       Psychiatric    Conversion disorder - Primary     Other Visit Diagnoses       Anxiety                 Thank you for allowing us to participate in Ms. Rutledge's care. If you have any questions, please contact Dr. Wadsworth at [phone].     Aryan Jackson, Ph.D.  Postdoctoral Fellow in Clinical Neuropsychology  [facility] - Department of Neurology    Destin Wadsworth, Ph.D.  Licensed Clinical Neuropsychologist   [facility] - Department of Neurology    HPI/CURRENT CONCERNS:   Cognitive Functioning   Onset & course of difficulty: Ms. Rutledge noticed cognitive changes since 2022 that have progressively worsened over time.     Examples of cognitive changes:   Attention/Working Memory: She gets easily distracted and has difficulty focusing on tasks. She reports having many uncompleted tasks due to attention difficulties.  Executive Functioning/Planning: She has more difficulty following directions/instructions than she used to.   Processing Speed: She has to rewind the TV when watching shows or ask people to repeat themselves in conversations which she attributes to slower processing speed.  Language: She has word-finding difficulty. She knows what she wants to say but cannot think of the specific word. Sometimes describing the word can help her think of the correct word.  Visuospatial: No changes reported  Learning & Memory: She has difficulty recalling recent information, forgetfulness, and misplacing  items (i.e., wallet, debit card). She reports difficulty remembering specific details of events/information but can remember the overarching themes. Cues sometimes help her recall information. Ms. Rultedge reports writing important information down is helpful for later recall.     Exacerbating factors: Ms. Rutledge reports if there is external noise(s) around her, it is more difficult to focus.   Ameliorating factors: None reported    Daily Functioning    ADLs  Self-Care Eating Safety Other   Independent and without difficulty  Independent and without difficulty  Independent and without difficulty       Instrumental IADLs:   Driving Medication Mgmt/Health Household Mgmt Finances   Does not participate in this activity due to seizures. Ms. Rutledge manages her own medications and medical appointments. Her  and daughter give her reminders for medications and appointments.  Independent and without difficulty  Ms. Rutledge reports most bills are on autopay. Until June 2024, Ms. Rutledge was responsible for the remaining bills that are paid manually. Her  now manages these bills because she was forgetting to pay them.     Physical Symptoms:    Tremor: no   Difficulty walking: no   Imbalance: no   Falls: no   Weakness/Numbness: no   Trouble with fine motor movements: no   Lightheadedness/Dizziness/Syncope: no   Urinary or Bowel Urgency/Incontinence: no   Sensory Sxs: Ms. Rutledge feels that her vision is blurry but states that her eye exams have been normal.   Pain: None reported  Physical Exercise Routine: None reported     Psychiatric/Neuropsychiatric Symptoms   Depression: Denied  Current Ideation, Intention, or Plan: Denied   Homicidal Ideation, Intention, or Plan: Denied  Hyun/Hypomania: Denied  Anxiety: Ms. Rutledge reports anxiety symptoms have been present since 2021 and has gradually worsened over time. She gets very overwhelmed about all the tasks she needs to complete which makes it difficult to  "begin tasks. She reports anxiety symptoms are present daily and that walking is helpful when she feels anxious.   Stress: Denied  Social Withdrawal: Denied  Neurovegetative Sxs:  Appetite: No changes reported   Sleep: Ms. Rutledge reports sleep is variable. Some days she sleeps less than 6 hours and other days she can sleep 10+ hours. She reports feeling like there are at least 2 days/week where she feels like she can sleep all day. She has difficulty falling asleep due to anxiety and worry. She reports within the last 2 years, she now needs noise in the background to fall asleep and typically puts the TV on with a timer. Additionally, she described having events where she starts crying and has shortness of breath. She states that they are like a panic attack but does not believe that is what's happening. She reports these events happen 1x every 2 weeks or 10-monico days and usually happen at nighttime.   Energy: Good  Hallucinations: Denied  Delusional/Paranoid Thinking: Denied  Obsessive/Compulsive Behaviors: Denied  Impulsivity/Compulsivity: Denied  Disinhibition: Denied  Irritability/Agitation: Ms. Rutledge has noticed she is more irritable and has a "shorter fuse." For example, she is more irritated/trigger by noises when before, she had no problems with noise given that she was a pre-.    Aggression: Denied  Apathy/Indifference: Denied  Other changes in personality: Denied    RELEVANT HISTORY  Psychiatric History   Prior Diagnoses: PNES  History of Trauma/Abuse: no   History of Suicide Attempts: no   Medication(s): no   Hospitalization(s): no  Psychotherapy/Counseling: no   Other: no     Substance Use History   Ms. Rutledge smokes 1 pack of cigarettes per week; 3 cigarettes/day. She denied use of alcohol or other substances.     Neurological History    Headaches/Migraines: She takes Maxalt as needed for headaches after seizures.  TBI: no   Seizures:   Seizure history per neurology notes:  AEDs " "Lamictal 300mg ER  Vimpat 200/200   Failed AEDs Fycompa (dizziness, agitated)  Keppra (agitated)  Depakote (not efficacious)   Compliance  Good    Age of onset  6 months old   Semiology  1st type: Per mother she turns her head and stares off. Her right hand starts tapping. Sometimes she starts tapping just before she starts staring off. Sees strobe type lights and "squiggles" in her vision prior to and after the event. Can progress to convulsion.  - Aura: Hearing a buzzing or extremely loud train noise  - Triggers: car headlights at night  - Frequency: twice per month  - Duration: ~30 seconds     2nd type: electrical burning smell  - Triggers: car headlights at night  - Frequency: once per month  - Duration: ~30 seconds     3rd type: Crumbles to the ground. Has been associated with injury (tailbone fracture) although inconsistently.   - Frequency: once per week     Status epilepticus: none      Current Seizure Activity: Ms. Rutledge reported at today's appointment (12/23/2024) that she has 1-2 seizures per week that lasts about 30 seconds. During the seizures, she reports staring off, hearing a buzzing in her ear or train noise, smelling something burning, and getting squiggles in her vision.     Presurgical considerations discussed during neuropsychological evaluation on 12/23/2024:  Goals/Expectations: Ms. Rutledge expressed her primary goal for wanting surgery would be to stop seizures and any further cognitive decline.   Surgery Risks: Ms. Rutledge reports that she has not spoken with her neurosurgery team about the risks of surgery but acknowledged that she is aware surgery would include taking out a section of her brain and that she worries if her quality of life following surgery will improve.   Social Support: Ms. Rutledge reported her mother, , and daughter as her support system.   Follow-up Testing: Ms. Rutledge has an appointment scheduled for updated brain MRI scheduled for 01/11/2025.  Stroke: no "   Tumor: no   Previous Episodes of Delirium: no   Movement Disorder: no   CNS Infection: no   Other: no     Family Neurological & Psychiatric History     Family history includes Cancer in her maternal grandfather; Diabetes in her paternal grandmother.  Neurologic: Negative for heritable risk factors.   Psychiatric: Negative for heritable risk factors.    Development  Education   Prenatal and  development: wnl  Developmental milestones: wnl  Language Acquisition: English first language  Level Attained: Bachelor's in childhood education   Learning/Attention/Behavior Difficulties: Ms. Rutledge reports longstanding memory problems and states that she studied more than her classmates and had to write everything down. Even with studying, she couldn't always recall the information she reviewed/studied.  Repeated Grade(s): no        Occupation  Social    Service: no   Occupational Status: She stopped working in 2019 when she was diagnosed with seizure disorder. She has been on disability for seizures since .  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 She attempted to return to her previous job 3 years ago but struggled with the work and the environment due to seizures.   Family Status:  6 years. She has 2 daughters.   Current Living Situation: Ms. Rutledge, her , and her daughters live together.   Support System: Good       Medical Status   Patient Active Problem List   Diagnosis    Transient neurological symptoms    Chronic migraine    Sleep disorder    Mesial temporal sclerosis    Migraine without aura and without status migrainosus, not intractable    Insomnia due to medical condition    Conversion disorder    Refractory epilepsy    Partial symptomatic epilepsy with complex partial seizures, intractable, without status epilepticus     Past Medical History:   Diagnosis Date    Seizures      Past Surgical History:   Procedure Laterality Date    APPENDECTOMY        "SECTION      x 2    CHOLECYSTECTOMY      ENDOMETRIAL ABLATION      HYSTERECTOMY      SINUS SURGERY         Neurodiagnostics   EMU - 12/06/2024 - 12/11/2024  Hospital Course:   12/6>12/7: Home Lamictal held at admission and Vimpat decreased to 100 mg BID. Patient had witnessed non-epileptic event this morning where she was repeating a phrase;  said this is one of her typical event types, there was no EEG correlate with this event. Plan for hold Vimpat beginning this evening and undergo sleep deprivation with meds overnight.   12/7>12/8: Successfully sleep deprived until 5:00AM. EEG normal without any further clinical events.   12/8>12/9: PB ~2026 for episode described as heart racing and "not feeling well" followed by crying, hyperventilating, tingling sensation in whole body, and "fidgeting" with clothing/blankets with impaired awareness with no EEG correlate. Neuropsychology evaluation today for nonepileptic events. EEG remains WNL. Continue with provoking measures to evaluate for concurrent epilepsy- repeat sleep deprivation tonight and activation medications 12/10 AM.  12/9>12/10: Successful sleep deprivation overnight, received activation medications this morning. EEG remains WNL. PB ~0808 for typical episode of staring associated with slurred speech, tingling sensation, and abdominal sensation with no EEG correlate. HV/PS today.  12/10>12/11: NAEON. EEG WNL throughout admission off AEDs with repeat provoking measures. Multiple typical events with no EEG correlate c/w conversion disorder. No evidence to suggest concurrent diagnosis of epilepsy. Discharged home in stable condition on resumed medications after discussion with patient's established neurologist, Dr. Elam. Recommend repeating MRI with Epilepsy Protocol to evaluate for MTS noted on previous MRI in 2020. Patient has scheduled follow up with Dr. Elam.    NM PET CT FDG Brain - 12/06/2024  Narrative & Impression  EXAMINATION:  NM PET CT FDG " BRAIN     CLINICAL HISTORY:  Seizure disorder, surgical planning;  Localization-related (focal) (partial) symptomatic epilepsy and epileptic syndromes with complex partial seizures, intractable, without status epilepticus     TECHNIQUE:  After the administration of 8.6 mCi FDG, positron emission tomography images of the brain were obtained.  Coronal, sagittal, and transaxial coronal and MIP images were displayed.  Noncontrast CT of the brain was obtained for attenuation correction.  Glycemia at the time of injection was 91 mg/dL.     Quantitative analysis was performed with Modti.     COMPARISON:  MRI 11/26/2024 and 05/28/2020.     FINDINGS:  Minimal asymmetric hypometabolism within the right hippocampal region as compared to the left.  Otherwise, there is symmetric metabolic activity within the cerebral and cerebellar hemispheres.     Impression:  Minimal asymmetric hypometabolism within the right hippocampal region as compared to the left.  Recommend correlation with history/exam and previous imaging.        Electronically signed by:Michael Vasquez  Date:                                            12/06/2024  Time:                                           11:23       MRI Brain Functional with a physician (NERY) - 11/26/2024  Narrative & Impression  EXAMINATION:  MRI BRAIN FUNCTIONAL WITH A PHYSICIAN (XPD)     CLINICAL HISTORY:  Localization-related (focal) (partial) symptomatic epilepsy and epileptic syndromes with complex partial seizures, intractable, without status epilepticus     TECHNIQUE:  BOLD functional MR imaging was performed on a 3 Kylie Siemens MR scanner.  Exam included the following paradigms:     Bilateral finger tapping     Word generation     Sentence completion     Verb generation     The patient was trained in the fMRI tasks prior to scanning.  Physician performed this training and was present throughout image acquisition. An Stark handedness survey was administered to the patient prior  to the exam.     Volumetric T1 MPRAGE without contrast was used for localization.     COMPARISON:  Anatomic MR 05/28/2020     FINDINGS:  Patient is considered right handed.     Structural/anatomic images: No new focal parenchymal abnormalities are identified on this T1-weighted sequence.     Functional findings:     Bilateral finger tapping performed as internal , demonstrates activations in the posterior cortical bank of the precentral gyri the expected location of the hand knob.     Multiple language paradigms performed.  Convergent activations in the left inferior frontal gyrus,, including the pars opercularis and pars triangularis.  This would be the expected location of the frontal speech area (traditional Broca's area), likely represents cortex involved with expressive language function.  Convergent activations at the junction of the left superior and middle temporal gyri posteriorly. This would be the expected location of temporal speech area (traditional Wernicke's area), likely represents receptive language function. Convergent activations in the posterior aspect of the left superior frontal gyrus, in keeping with language SMA or pre-SMA.  Additional activations throughout the left dorsolateral prefrontal cortex on multiple paradigms.  There are minimal right-sided activations on all paradigms.     Impression:  Findings compatible with left hemisphere language dominance with frontotemporal concordance.        Electronically signed by:Lokesh Zheng MD  Date:                                            11/26/2024  Time:                                           13:50       Results for orders placed or performed during the hospital encounter of 05/28/20   MRI Brain W WO Contrast    Narrative    EXAMINATION:  MRI BRAIN W WO CONTRAST    CLINICAL HISTORY:  Seizures new or progressive;. Unspecified convulsions    COMPARISON:  None.    FINDINGS:  No mass, mass effect or hemorrhage can be seen in the  brain.  Decreased volume of the right hippocampus.  Increased intensity in FLAIR sequence in the right CNA.  Mild dilation of the few jaspreet horn of the right lateral ventricle.      Impression    Findings mostly consistent with right mesial temporal sclerosis with decreased volume of the CNA, loss of anatomical features and hyperintensity in FLAIR sequence indicating sclerotic changes.      Electronically signed by: Robert Whitmore  Date:    05/28/2020  Time:    13:07   Results for orders placed or performed in visit on 03/27/20   EEG    Narrative    Felipe Davalos MD     3/27/2020  8:12 PM  [facility]  [address] [place] [id number]     ROUTINE EEG REPORT     Patient Name: Lina Rutledge - [id number]  DATE OF PROCEDURE: 3/27/2020      HISTORY: 29 year-old woman with a history of seizures.    MEDICATIONS: lamictal, clonazepam, fycompa     TEST DESCRIPTION: This is a routine EEG recording with electrodes   placed according to the International 10/20 Electrode Placement   System.  Photic stimulation and hyperventilation were utilized as   activation procedures unless otherwise specified below. The   patient tolerated the entire procedure well.    EEG DESCRIPTION:  A normal awake background frequency was seen at 10 Hz, 10-30 uV,   bioccipital, symmetric, reactive to eye opening and closure.  No focal slowing was seen.  No epileptiform discharges or paroxysmal abnormalities were seen.  Drowsiness was demonstrated by slow rolling eye movements   followed by loss of background waking activities. No sleep   architecture was seen.  Hyperventilation was not performed. Photic stimulation was   performed at frequencies of 2 to 26 Hz but produced no notable   change in EEG.  Muscle, movement and electrode artifacts were occasionally noted.    EVENTS:  No electrographic seizures were seen.    EEG INTERPRETATION:  This is a normal EEG for age in the awake and drowsy states.    CLINICAL  "CORRELATION:  The absence of epileptiform abnormalities does not preclude a   clinical diagnosis of seizures. If indicated, a follow-up EEG, to   include sleep, may be clinically useful.       Pertinent Lab Work   No results found for: "[id number]"  No results found for: "RPR"  No results found for: "FOLATE"  No results found for: "TSH", "H0NOYXC", "V2WHABR", "THYROIDAB"  No results found for: "LABA1C", "HGBA1C"  No results found for: "HIV1X2", "ULK27THLX"      Medications     Current Outpatient Medications:     diazePAM (VALTOCO) 15 mg/2 spray (7.5/0.1mL x 2) Spry, SPRAY 1 SPRAY IN THE NOSE AS NEEDED SEIZURE, Disp: 4 each, Rfl: 3    lacosamide (VIMPAT) 200 mg Tab tablet, Take 1 tablet (200 mg total) by mouth every 12 (twelve) hours., Disp: 60 tablet, Rfl: 3    lamotrigine XR (LAMICTAL XR) 300 mg XR tablet, Take 1 tablet (300 mg total) by mouth once daily., Disp: 30 tablet, Rfl: 6    rizatriptan (MAXALT-MLT) 5 MG disintegrating tablet, Take 1 tablet (5 mg total) by mouth as needed for Migraine. May repeat in 2 hours if needed, Disp: 30 tablet, Rfl: 2       OBJECTIVE:     MENTAL STATUS AND OBSERVATIONS:   Appearance: Casually dressed and adequate grooming/hygiene.   Alertness: Attentive and alert   Orientation:   O x 4    Gait:  Unable to assess   Psychomotor:  Unable to assess   Handedness:  Right/   Vision & Hearing:  Adequate for session   Speech/language: Normal in rate, rhythm, tone, and volume. No significant word finding difficulty observed. Comprehension was normal.    Mood/Affect:  The patient's stated mood was "anxious." Affect was congruent with stated mood.    Interpersonal Behavior:  Rapport was quickly and easily established   Suicidality/Homicidality: Denied   Hallucinations/Delusions:  None evidenced   Thought Content: Logical   Thought Processes: Goal-directed   Insight & Judgment:  Appropriate   Participation in Interview:  Full + collateral     PROCEDURES/TESTS ADMINISTERED: Performed a review of " pertinent medical records, reviewed limits to confidentiality, conducted a clinical interview, and explained procedures.

## 2024-12-23 ENCOUNTER — OFFICE VISIT (OUTPATIENT)
Dept: NEUROLOGY | Facility: CLINIC | Age: 34
End: 2024-12-23
Payer: MEDICARE

## 2024-12-23 DIAGNOSIS — F41.9 ANXIETY: ICD-10-CM

## 2024-12-23 DIAGNOSIS — G40.219 PARTIAL SYMPTOMATIC EPILEPSY WITH COMPLEX PARTIAL SEIZURES, INTRACTABLE, WITHOUT STATUS EPILEPTICUS: Primary | ICD-10-CM

## 2024-12-23 DIAGNOSIS — F44.9 CONVERSION DISORDER: ICD-10-CM

## 2025-01-02 PROBLEM — G40.219 PARTIAL SYMPTOMATIC EPILEPSY WITH COMPLEX PARTIAL SEIZURES, INTRACTABLE, WITHOUT STATUS EPILEPTICUS: Status: ACTIVE | Noted: 2025-01-02

## 2025-01-11 ENCOUNTER — HOSPITAL ENCOUNTER (OUTPATIENT)
Dept: RADIOLOGY | Facility: HOSPITAL | Age: 35
Discharge: HOME | End: 2025-01-11
Payer: MEDICARE

## 2025-01-11 DIAGNOSIS — G40.919 REFRACTORY EPILEPSY: ICD-10-CM

## 2025-01-11 PROCEDURE — 70551 MRI BRAIN STEM W/O DYE: CPT | Mod: 26,,, | Performed by: RADIOLOGY

## 2025-01-11 PROCEDURE — 70551 MRI BRAIN STEM W/O DYE: CPT | Mod: TC

## 2025-01-15 ENCOUNTER — OFFICE VISIT (OUTPATIENT)
Dept: NEUROLOGY | Facility: CLINIC | Age: 35
End: 2025-01-15
Payer: MEDICARE

## 2025-01-15 DIAGNOSIS — F44.9 CONVERSION DISORDER: Primary | ICD-10-CM

## 2025-01-15 DIAGNOSIS — G40.219 PARTIAL SYMPTOMATIC EPILEPSY WITH COMPLEX PARTIAL SEIZURES, INTRACTABLE, WITHOUT STATUS EPILEPTICUS: ICD-10-CM

## 2025-01-15 DIAGNOSIS — F41.1 GENERALIZED ANXIETY DISORDER: ICD-10-CM

## 2025-01-15 PROCEDURE — 99211 OFF/OP EST MAY X REQ PHY/QHP: CPT | Mod: PBBFAC | Performed by: STUDENT IN AN ORGANIZED HEALTH CARE EDUCATION/TRAINING PROGRAM

## 2025-01-15 PROCEDURE — 96133 NRPSYC TST EVAL PHYS/QHP EA: CPT | Mod: ,,, | Performed by: STUDENT IN AN ORGANIZED HEALTH CARE EDUCATION/TRAINING PROGRAM

## 2025-01-15 PROCEDURE — 96139 PSYCL/NRPSYC TST TECH EA: CPT | Mod: ,,, | Performed by: STUDENT IN AN ORGANIZED HEALTH CARE EDUCATION/TRAINING PROGRAM

## 2025-01-15 PROCEDURE — 99999 PR PBB SHADOW E&M-EST. PATIENT-LVL I: CPT | Mod: PBBFAC,,, | Performed by: STUDENT IN AN ORGANIZED HEALTH CARE EDUCATION/TRAINING PROGRAM

## 2025-01-15 PROCEDURE — 96138 PSYCL/NRPSYC TECH 1ST: CPT | Mod: ,,, | Performed by: STUDENT IN AN ORGANIZED HEALTH CARE EDUCATION/TRAINING PROGRAM

## 2025-01-15 PROCEDURE — 99499 UNLISTED E&M SERVICE: CPT | Mod: S$PBB,,, | Performed by: STUDENT IN AN ORGANIZED HEALTH CARE EDUCATION/TRAINING PROGRAM

## 2025-01-15 PROCEDURE — 96132 NRPSYC TST EVAL PHYS/QHP 1ST: CPT | Mod: ,,, | Performed by: STUDENT IN AN ORGANIZED HEALTH CARE EDUCATION/TRAINING PROGRAM

## 2025-01-16 NOTE — PROGRESS NOTES
"[facility]  Clinic Note    Date of Service: 1/16/2025  Patient seen at the request of: No ref. provider found    Reason for Consultation  Seizures    Assessment:  Lina Rutledge is a 34 y.o. 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  Patient presents with very frequent focal seizures that have been associated with progression to convulsions.    Semiologies:  At least three different seizure semiologies noted.      First semiology is reported to be a visual strobing or sometimes squiggles in her vision.  These symptoms are sometimes preceded by a loud auditory aura.  Sometimes progress to convulsion.    Patient also describes an isolated burning smell and a third phenomenon in which she "crumbles to the ground".  Once, this third semiology led to an injury as she fell down the stairs.  Query as to whether it is not true atonia as the patient does not always injure herself during these events.    Seizures are usually associated with severe headache after their offset.    Imaging:  CT and MRI brain were unremarkable in 2018.   MRI brain in 2020 significant for right mesial temporal sclerosis.    Repeat MRI brain epilepsy protocol 2025: No focal epileptogenic lesion identified     PET: Minimal asymmetric hypometabolism within the right hippocampal region   fMRI: left hemispheric dominant    EEG:  During the onset of seizures, pt was admitted to [facility] a few times and EEG on 12/7/2018 showed infrequent sharply contoured waves with phase reversal. EEG on 12/11/2018 showed generalized slowing consistent with encephalopathy vs medication effect (while on Vimpat).     24 hour : normal   Per tech note "Pt says she was awaken by a bad dream, patient breathing heavily and recounting the events of the dream.".There is no clear epileptic electrographic correlate to this event     Routine EEG 12/2024: normal    EMU 12/2024 (5 days): normal background  - " "Repeating a phrase typical - no EEG correlate  - Heart racing, crying, hyperventilating, tingling sensation in whole body and fidgeting with clothing/blankets, impaired awareness - no EEG correlate  - Staring episode with slurred speech, tingling, and abdominal sensation typical - no EEG correlate  - Patient reports that the intense fear was captured in the EMU (associated with hand picking and repeating).  Patient "vaguely" remembers this captured event.    - Burning smell was not captured in the EMU    Other workup:  Neuropsychology: pending    Discussion:  All of the above workup and history has been discussed with the patient.  Burning smell was not captured in the recent EMU, however, impaired consciousness associated with picking/fidgeting and a sensation of fear was captured in the EMU and without EEG correlate.  This degree of impaired consciousness would be expected to show abnormalities on the EEG if epileptic in etiology.    We discussed the possibility of a mixed picture of epileptic and nonepileptic seizures.  It is theoretically possible that the patient is having focal, scalp-negative seizures that can provoke a nonepileptic progression of semiology, or that a history of focal, scalp-negative seizures has, in part, led to the nonepileptic seizures that were captured during the EMU admission.    I discussed with the patient that invasive monitoring can not be pursued if the patient does not have definitive evidence of epileptic seizures on EEG.  There is too much risk for harm to the patient without assurance of benefit.  I discussed with her the merits of attempting to treat the nonepileptic seizures in order to improve quality of life, as well as unmask underlying epileptic seizures.    Query any contribution of migraine/acephalgic migraine as the patient reports visual strobing or sometimes squiggles in her vision +/- a following headache.  Some of the semiologies may be due to network phenomena " outside of epileptic seizure and may respond to CGRP inhibitors or other related medications.    Patient would like to take some time to lizet over these developments.  I will plan to discuss this case in surgical conference to get more opinions on this nuanced case.      Treatment:    Current medications:    - Lamictal 300mg ER   - Vimpat 200/200  Past medications:    - Fycompa (dizziness, agitated)   - Keppra (agitated)   - Depakote (not efficacious)    Plan:    - discuss with epilepsy surgical conference  - continue lamictal 300mg ER  - Vimpat 200/200    - Maxalt 5mg as needed for headaches after seizures  - neuropsychological testing      - RTC in 3 months    SEIZURE/EVENT PRECAUTIONS INCLUDE:  NO DRIVING until approximately 3-6 months have passed WITHOUT a seizure.   If you have a seizure, you will need to wait another 3 months to be eligible to drive again.  Avoid bathing or swimming alone or unsupervised.  Avoid cooking over large ranges or open flames.    Avoid climbing up heights unsupervised.  Avoid operating heavy machinery.     SEIZURE SAFETY:  When an epileptic seizure occurs, the following should be done to prevent injuries:  Do not hold or tie the person down.  Do not place anything in the person's mouth or try to force the teeth apart. The person is not in danger of swallowing his tongue.  Do not pour any liquid into the persons mouth or offer food or medicines until he is fully awake.  If possible, turn the person on his side during the seizure.  Place something soft under the person's head, loosen tight clothing, and clear the area of sharp or hard objects.  Stay with the person until the seizure ends. Let the person rest until he is fully awake.  Use a watch to time how long the seizure lasts.   Watch the type of movement and position of the person's head or eyes during the seizure.      A dictation device was used to produce this document. Use of such devices sometimes results in grammatical  "errors or replacement of words that sound similarly.     Signed:    Prashanth Elam MD  Neurology/Epilepsy  2025 7:53 AM      HPI:  Lina Rutledge is a 34 y.o. female with   Past Medical History:   Diagnosis Date    Seizures      MRI shows right MTS.    Patient reports one EMU visit in [place] with no seizure captured.      Significant decline in short term memory noted over the last 6 years.    First seizure: 6 months old    Semiology:   1st type:   Per mother she turns her head and stares off. Her right hand starts tapping. Sometimes she starts tapping just before she starts staring off. Sees strobe type lights and "squiggles" in her vision prior to and after the event. Can progress to convulsion.  - Aura: Hearing a buzzing or extremely loud train noise  - Triggers: car headlights at night  - Frequency: twice per month  - Duration: ~30 seconds    2nd type:   electrical burning smell  - Triggers: car headlights at night  - Frequency: once per month  - Duration: ~30 seconds    3rd type:   Crumbles to the ground  Has been associated with injury (tailbone fracture)  - Frequency: once per week    Status epilepticus: none    Seizure Risk Factors:   Birth history: 37 weeks   Developmental history: none  Febrile seizure: yes at 6 months   CNS infection: none  Head trauma: none  Family history: none    Previous Anti-Epileptic Medication:    Current medications:    - Lamictal 300mg ER   - Vimpat 200/200  Past medications:    - Fycompa (dizziness, agitated)   - Keppra (agitated)   - Depakote (not efficacious)  Driving: none      This is the extent of the patient's complaints at this time.    No results found for: "TSH", "V12", "[id number]", "HGBA1C"      Review of Systems:  ROS negative unless noted in HPI    Past Surgical History:  Past Surgical History:   Procedure Laterality Date    APPENDECTOMY       SECTION      x 2    CHOLECYSTECTOMY      ENDOMETRIAL ABLATION      HYSTERECTOMY      SINUS SURGERY   "       Family History:  Family History   Problem Relation Name Age of Onset    Cancer Maternal Grandfather      Diabetes Paternal Grandmother         Social History:  Social History     Tobacco Use    Smoking status: Every Day     Current packs/day: 0.25     Types: Cigarettes    Smokeless tobacco: Current   Substance Use Topics    Alcohol use: Not Currently    Drug use: Never       Allergies:  Cefprozil    Outpatient Medications:  Prior to Admission medications    Medication Sig Start Date End Date Taking? Authorizing Provider   diazePAM (VALTOCO) 15 mg/2 spray (7.5/0.1mL x 2) Spry SPRAY 1 SPRAY IN THE NOSE AS NEEDED SEIZURE 6/27/23   Joyce Costa APRN   lacosamide (VIMPAT) 200 mg Tab tablet Take 1 tablet (200 mg total) by mouth every 12 (twelve) hours. 7/31/24   Joyce Costa APRN   lamotrigine XR (LAMICTAL XR) 300 mg XR tablet Take 1 tablet (300 mg total) by mouth once daily. 7/31/24 2/26/25  Joyce Costa APRN       Physical exam:    Vitals: LMP  (LMP Unknown)     General:   Sitting in chair, in no distress, well-nourished, well-developed, appears stated age.  Head/Neck:   Normocephalic,atraumatic  Pulm:  Non-labored breathing     Mental Status: Alert and oriented to person, time, place, situation. Speech spontaneous and fluent without paraphasias; no dysarthria  CN:  II: visual fields full  III, IV, VI: EOM intact without nystagmus or diplopia.   V: Sensation to light touch full and symmetric in V1-3. Masseter contraction full bilaterally.   VII: Facial movement full and symmetric.   VIII: Hearing grossly normal to conversation.  IX, X: Palate midline with symmetric elevation.    XI: SCM and trapezius: 5/5 bilaterally.   XII: Tongue midline without fasciculations.  Motor: Normal bulk and tone throughout all four extremities.   LUE: D: 5/5; B: 5/5; T:  5/5; WF: 5/5; WE:  5/5; IO: 5/5   RUE: D: 5/5; B: 5/5; T:  5/5; WF:5/5; WE:  5/5; IO: 5/5   LLE: HF: 5/5, KE: 5/5, KF: 5/5, DF: 5/5, PF: 5/5  RLE: HF: 5/5,  KE: 5/5, KF: 5/5, DF: 5/5, PF: 5/5  No tremors   Sensory: Intact and symmetric to light touch throughout.  Reflexes: LUE: Biceps 2+ brachioradialis 2+  RUE: Biceps 2+, brachioradialis 2+  LLE: Knee 2+, ankle 2+  RLE: Knee 2+, ankle 2+  Coordination:  Intact and symmetric to finger-to-nose and heel-to-shin.  Gait:  Intact to casual gait.    Imaging:  All pertinent imaging was personally reviewed.    On my personal review:   Agree with MRI brain read of right MTS      Results for orders placed during the hospital encounter of 05/28/20    MRI Brain W WO Contrast    Narrative  EXAMINATION:  MRI BRAIN W WO CONTRAST    CLINICAL HISTORY:  Seizures new or progressive;. Unspecified convulsions    COMPARISON:  None.    FINDINGS:  No mass, mass effect or hemorrhage can be seen in the brain.  Decreased volume of the right hippocampus.  Increased intensity in FLAIR sequence in the right CNA.  Mild dilation of the few jaspreet horn of the right lateral ventricle.    Impression  Findings mostly consistent with right mesial temporal sclerosis with decreased volume of the CNA, loss of anatomical features and hyperintensity in FLAIR sequence indicating sclerotic changes.      Electronically signed by: Robert Whitmore  Date:    05/28/2020  Time:    13:07      I spent a total of 66 minutes on the day of the visit. This includes face to face time and non-face to face time preparing to see the patient (eg, review of tests), obtaining and/or reviewing separately obtained history, documenting clinical information in the electronic or other health record, independently interpreting results and communicating results to the patient/family/caregiver, or care coordinator.

## 2025-01-17 ENCOUNTER — OFFICE VISIT (OUTPATIENT)
Dept: NEUROLOGY | Facility: CLINIC | Age: 35
End: 2025-01-17
Payer: MEDICARE

## 2025-01-17 VITALS
BODY MASS INDEX: 27.95 KG/M2 | DIASTOLIC BLOOD PRESSURE: 79 MMHG | WEIGHT: 151.88 LBS | SYSTOLIC BLOOD PRESSURE: 119 MMHG | HEIGHT: 62 IN | HEART RATE: 85 BPM

## 2025-01-17 DIAGNOSIS — G43.009 MIGRAINE WITHOUT AURA AND WITHOUT STATUS MIGRAINOSUS, NOT INTRACTABLE: ICD-10-CM

## 2025-01-17 DIAGNOSIS — G40.909 SEIZURE DISORDER: ICD-10-CM

## 2025-01-17 PROCEDURE — 99215 OFFICE O/P EST HI 40 MIN: CPT | Mod: S$PBB,,, | Performed by: STUDENT IN AN ORGANIZED HEALTH CARE EDUCATION/TRAINING PROGRAM

## 2025-01-17 PROCEDURE — 99213 OFFICE O/P EST LOW 20 MIN: CPT | Mod: PBBFAC | Performed by: STUDENT IN AN ORGANIZED HEALTH CARE EDUCATION/TRAINING PROGRAM

## 2025-01-17 PROCEDURE — 99999 PR PBB SHADOW E&M-EST. PATIENT-LVL III: CPT | Mod: PBBFAC,,, | Performed by: STUDENT IN AN ORGANIZED HEALTH CARE EDUCATION/TRAINING PROGRAM

## 2025-01-17 RX ORDER — RIZATRIPTAN BENZOATE 5 MG/1
5 TABLET, ORALLY DISINTEGRATING ORAL
Qty: 30 TABLET | Refills: 2 | Status: SHIPPED | OUTPATIENT
Start: 2025-01-17 | End: 2025-02-16

## 2025-01-17 RX ORDER — LACOSAMIDE 200 MG/1
200 TABLET ORAL EVERY 12 HOURS
Qty: 60 TABLET | Refills: 3 | Status: SHIPPED | OUTPATIENT
Start: 2025-01-17

## 2025-01-17 RX ORDER — LAMOTRIGINE 300 MG/1
300 TABLET, EXTENDED RELEASE ORAL DAILY
Qty: 30 TABLET | Refills: 6 | Status: SHIPPED | OUTPATIENT
Start: 2025-01-17 | End: 2025-08-15

## 2025-01-23 NOTE — PROGRESS NOTES
"NEUROPSYCHOLOGY CONSULT   Referral Information  Name: Lina Rutledge  MRN: [id number]  : 1990  Age: 34 y.o.  Race: White  Gender: female  Referring Provider: Prashanth Elam MD  Date Conducted: 2024 & 01/15/2025  The chief complaint leading to consultation/medical necessity is: Epilepsy pre-surgical evaluation  Consent/Emergency Plan: The patient expressed an understanding of the purpose of the evaluation and consented to all procedures, including providing consent for Aryan Jackson, Ph.D., a clinical neuropsychology fellow under the supervision of Destin Wadsworth, Ph.D., to be involved in her care. We discussed the limits of confidentiality and discussed an emergency plan. Ms. Rutledge additionally provided consent to speak with her mother, Sangeeta, who was present during the clinical interview.    SUMMARY/TREATMENT PLAN   Summary of History:  Ms. Rutledge is a 34 y.o., right-handed, White, woman with 16 years of formal education. She was referred by her neurology team in the context of pre-surgical evaluation in consideration of neurosurgical intervention for intractable epilepsy. She also has psychogenic non-epileptic events (PNEE). Medical history is otherwise unremarkable. EMU stay -2024 with continuous EEG captured "multiple typical events with no EEG correlate c/w conversion disorder." Remote outside EEG from 2018 showed some abnormalities; however, a typical electrographic seizure has not yet been captured on EEG, per available records. Most-recent neurologic exam completed on 2024 was unremarkable. Most-recent brain PET CT completed on 2024 documented "minimal asymmetric hypometabolism within the right hippocampal region as compared to the left." Brain fMRI completed on 2024 documented "left hemisphere language dominance with frontotemporal concordance." Most-recent laboratory studies drawn on 2024 were non-contributory. Updated brain MRI completed on 2025 " "documented "no focal epileptogenic lesion."     During interview, Ms. Rutledge reported the gradual onset and progressive course of cognitive concerns beginning in 2022. Specifically, she characterized difficulties with attention/concentration, processing speed, word-finding, and recall for recent information. Ms. Rutledge's  started managing the finances that were her responsibility in June 2024 due to Ms. Rutledge forgetting to pay bills. She is independent in all other activities of daily living, though she does receive reminders to take medications and for medical appointments from her  and daughter. Ms. Rutledge reported that she stopped working in 2019 after being diagnosed with seizure disorder and that she has been on disability for seizures since 2020.     Emotionally, Ms. Rutledge reported anxiety symptoms have been present since 2021 and have worsened over time. She also reports variable sleep due to anxiety- or worry-related thoughts that keep her awake. Additionally, she described having events where she starts crying and has shortness of breath; typically occurring once every 10-14 days and usually at night. She states these events are like a panic attack but does not believe that is what's happening. EMU evaluation in 2020 captured a similar such event occurring out of sleep that was not epileptogenic in nature. Ms. Rutledge was seen by neuropsychology during her EMU in 12/2024 for PNES and was encouraged to establish care with a mental health provider for treatment at that time.     EPILEPSY SURGERY CONFERENCE TABLE   Handedness:  Right-handed   Language Acquisition: English monolingual   Education: 16 years of education. She reported longstanding memory and recall inefficiencies since childhood that made learning new information difficult.    Cognitive Functioning: Normal neuropsychological exam; isolated weaknesses on verbal fluency and naming tasks of doubtful clinical significance. "   Lateralization and Localization    EEG: EMU 12/2024: multiple typical events with no EEG correlate c/w conversion disorder.   PET: 12/06/2024: minimal asymmetric hypometabolism within the right hippocampal region as compared to the left.   MRI: 01/11/2025: no focal epileptogenic lesion identified.   fMRI: 11/26/2024: left hemisphere language dominance with frontotemporal concordance.   Neuropsych Testing    Lateralization: No clearly defined lateralization   Localization:  No clearly defined localization when considering medication effects and anxiety on testing.   Concordance: No clear focal deficit to suggest an epileptogenic focus; generally concordant with most-recent EEG, MRI, and fMRI.   Mood/ Psychiatric: Mood and personality testing is consistent with her history of psychogenic nonepileptic events and it is recommended she establish care with a mental health provider. Ms. Rutledge does not have psychiatric concerns that are a contraindication to intervention if deemed appropriate.     Quality of life is moderately affected by epilepsy, favoring improvement if she has cessation of non-epileptic events with treatment and/ or a good surgical outcome.   Substance Use: Ms. Rutledge did not report substance use concerns.   Presurgical Considerations:     Capacity: There are no concerns for decision-making capacity.   Risk: Low to moderate risk of memory change given proposed right-sided intervention but with generally intact bilateral memory.     Tolerability: No concerns identified   Support System: Ms. Rutledge's support system appears adequate.   Need for additional Studies? No      Data Summary / Synthesis:   Ms. Salazars premorbid intellectual abilities were estimated to be in the average range, based on demographic information and a word reading task. Her performance on neuropsychological testing was consistent with premorbid expectations across all domains including visual-spatial/construction,  attention, working memory, processing speed, and executive functioning. There were two isolated low scores that may be seen in the context of anxiety about testing, medication side-effects, and statistically-normal intra-individual variability. Language was variable, with subtle decline on tasks of verbal fluency, semantic better than letter fluency, and naming tasks; however, this is absent of other lateralizing test performances across comprehensive testing.     Ms. Rutledge's responses on a standardized mood and personality inventory (see Results section) were consistent with her history of PNEE. Distress arising from concerns about health is itself a clinically significant concern that may cause or exacerbate PNEE symptoms. She endorsed a moderate level of overall quality of life reduction due to seizure worry, cognitive abilities, and social function.    Diagnostic Considerations: Ms. Rutledge's performance on testing reflect a normal neuropsychological exam with normal range personal weakness with aspects of language. During the interview Ms. Rutledge reported daily, anxiety symptoms that impact her day-to-day functioning and is most consistent with generalized anxiety. As stated above, her responses on a standardized inventory were consistent with her history of PNEE.     Problem List Items Addressed This Visit          Neuro    Partial symptomatic epilepsy with complex partial seizures, intractable, without status epilepticus       Psychiatric    Documented psychogenic non-epileptic events (PNEE) - Primary    Generalized anxiety disorder      Recommendations:   Medical Follow-Up: Continue usual follow up for current active medical issues.   Other Relevant Orders/Issues:   Neurology: Continued follow up with neurology to integrate these findings into the overall context of her clinical care and to implement any of the below recommendations as appropriate.   Mental Health: Recommended Ms. Rutledge establish  care with a mental health provider for individual psychotherapy for psychogenic nonepileptic events. A referral can be placed through [facility]. If she is interested in a community provider, she is encouraged to visit [email or web address].   Neuropsychology Follow-up: The result of this assessment should serve as a baseline for comparison. Consider repeat assessment 6 months following surgery, if completed, to assess for change and update his treatment plan if necessary.     Recommendations for Ms. Rutledge (based on concerns Ms. Rutledge discussed during interview):  Attention: Remember that inattention and lack of focus are major culprits to forgetting information so be sure and practice paying attention for adequate learning of information. If you rely on passive attention to remembering something (e.g., yeah, uh-huh approach), you'll find you cannot recall it later. I recommend the following to improve attention, which may aid in later recall:   Reduce distractions in the area as much as possible.  Look at the person as they are speaking to you.   Paraphrase as they are speaking  Write down important pieces of information   Ask them to repeat if you zone out.   Have them simplify and reduce information that you need to attend to during conversation.   Have visual cues to remind you if you need to do something later.  Processing Speed:   Using multiple modalities (e.g., listening, writing notes, asking questions, recording) to learn new information is likely to allow additional time for processing, thus improving memory for the material.   Allowing sufficient time to complete tasks will reduce frustration and help to ensure completion.  Language: Strategies to help with Word Finding. Not being able to find a word when you need it is a common and very annoying problem. It is not strictly a memory issue, but more a filing and retrieval issue. You know the word or name you want, but it cannot be found in your  brain file. It is like having all the files in your file cabinet emptied on the floor. Every piece of information is there but finding it can be a challenge. One strategy that may help is working with a speech pathologist/therapist to learn techniques to decrease word finding problems. Some of those strategies/techniques include the following:  Use circumlocutions. Describe the object you're trying to name instead of naming it.  Recite you're A, B, Cs. Go through the alphabet to see if a letter triggers finding the word.  Picture it. Try to visualize the spelling or writing of the word.  Relax. The harder you try to force yourself to come up with the word, the more frustrated you get and the worse you function.   Write it down. In situations where using correct words is critical, such as communicating on the radio while flying, write down the key words so that they are in front of you if needed.  Use word association. For words or names you continually misfile and can't find, try to come up with a word association, something that reminds you of the word or name.  Write a script. Try scripting something important that you want to say. Either write it out or practice it beforehand, so that you get it right.  Play word games. Doing crossword puzzles and playing word finding games may help your brain become more efficient at filing and retrieving words.  Executive Functioning:  Don't attempt to multi-task.  Separate tasks so that each can be completed one at a time.  Consider using a calendar/day planner, as that may be effective to help you plan and stay on track.  Color-coding specific tasks by importance may add additional benefit to your planner.  Break down large projects into smaller tasks and write down the steps to completing the task.  Taking notes while reading can help with recall.  Storing Information: Use the below strategies to help you further enhance how information is stored.  Rehearse -  Immediately after seeing/hearing something, try to recall it.  Wait a few minutes, then check again.  Gradually lengthen the intervals between rehearsals.  Repetition of learned material is critical to ensure storage of information to be learned. Self-test at home to ensure learning.  Write down important information to improve your attention and focus and to have something to look back on when you need to recall it.  Make sure the person doesn't rattle off, but presents in a clear, logical, and unhurried manner.   Recalling Information:  Jog your memory - Lose something?  Think back to when you last had it.  What did you do next?  And after that?  Mentally walk yourself through each activity that followed.  Prodding your memory this way may enable you to recall the location of the missing item.  Use a cue - Symbolic reminders (the proverbial string around the finger) are helpful.  So too are memos, timers, calendar notes, etc.--keep them in visible, appropriate places.  Get organized - Have fixed locations for all important papers, key phone numbers, medications, keys, wallet, glasses, tools, etc.  Develop routines - Routines can anchor memories so they do not drift away.    Sleep Hygiene: Poor sleep has a negative effect on cognition. Several strategies have been shown to improve sleep:   Caffeine intake in the afternoon and evening, as well as stuffing oneself at supper, can decrease the quality of restful sleep throughout the night.   Bedtime and wake-up times should be consistent every night and morning so the body becomes used to a single routine, even on the weekends.  Engage in daily physical activity, but not 2-3 hours before bedtime.   No technology use (television, computer, iPad) 1-2 hours before bed.   Have a wind down routine (e.g., soft lights in the house, bath before bed, reduced fluid intake, songs, reading, less noise) to promote sleep readiness.   Visit the www.sleepfoundation.org for more  strategies.     Practice good cognitive/brain health hygiene:  Engage in regular exercise, which increases alertness and arousal and can improve attention and focus.   Get a good night's sleep, as this can enhance alertness and cognition.  Eat healthy foods and balanced meals. It is notable that research indicates certain nutrients may aid in brain function, such as B vitamins (especially B6, B12, and folic acid), antioxidants (such as vitamins C and E, and beta carotene), and Omega-3 fatty acids. Talk with your physician or nutritionist about what's right for you.   Keep your brain active. Find activities to stay mentally active, such as reading, games (cards, checkers), puzzles (crosswords, Sudoku, jig saw), crafts ([employer], woodworking), gardening, or participating in activities in the community.  Stay socially engaged. Continue staying active with your family and friends.    Stress Management Recommendations: Consider stress management techniques to reduce anxiety and respond to anxiety as it arises. Here's a simple three-minute exercise you can use no matter where you are:  Sit or stand up nice and tall. Let your eyes relax. Relax your jaw. Let your shoulders relax down your back and start to focus your attention on your breath.  Breathe all the way in through your nose, filling your belly with your breath. Then, breathe all the way out through your nose. It's that simple.  Start to breathe into a count of three. Inhale for one, two, three. Then, exhale into a count of six: six, five, four, three, two and one.  Repeat. Inhale: one, two, three. Exhale: six, five, four, three, two and one.  Continue just like this for three minutes, or as long as you'd like. You can set a timer on your phone at the beginning of your practice.  Should your mind wander, simply notice, without judgment. Observe your thought. And let it go. Return your breath to your attention as many times as it takes, training your mind in the same  way we train our bodies.  https://blog.[facility].org/articles/3-minute-mindful-breathing-exercise-for-anxiety-relief   https://blog.[facility].org/articles/shake-it-off-in-the-new-year-simple-stress-relief-techniques         Thank you for allowing us to participate in Ms. Rutledge's care. If you have any questions, please contact Dr. Wadsworth at [phone].     Aryan Jackson, Ph.D.  Postdoctoral Fellow in Clinical Neuropsychology  [facility] - Department of Neurology    Destin Wadsworth, Ph.D.  Licensed Clinical Neuropsychologist   [facility] - Department of Neurology    HPI/CURRENT CONCERNS:   Cognitive Functioning   Onset & course of difficulty: Ms. Rutledge noticed cognitive changes since 2022 that have progressively worsened over time.     Examples of cognitive changes:   Attention/Working Memory: She gets easily distracted and has difficulty focusing on tasks. She reports having many uncompleted tasks due to attention difficulties.  Executive Functioning/Planning: She has more difficulty following directions/instructions than she used to.   Processing Speed: She has to rewind the TV when watching shows or ask people to repeat themselves in conversations which she attributes to slower processing speed.  Language: She has word-finding difficulty. She knows what she wants to say but cannot think of the specific word. Sometimes describing the word can help her think of the correct word.  Visuospatial: No changes reported  Learning & Memory: She has difficulty recalling recent information, forgetfulness, and misplacing items (i.e., wallet, debit card). She reports difficulty remembering specific details of events/information but can remember the overarching themes. Cues sometimes help her recall information. Ms. Rutledge reports writing important information down is helpful for later recall.     Exacerbating factors: Ms. Rutledge reports if there is external noise(s) around her, it is more difficult to focus.   Ameliorating  factors: None reported    Daily Functioning    ADLs  Self-Care Eating Safety Other   Independent and without difficulty  Independent and without difficulty  Independent and without difficulty       Instrumental IADLs:   Driving Medication Mgmt/Health Household Mgmt Finances   Does not participate in this activity due to seizures. Ms. Rutledge manages her own medications and medical appointments. Her  and daughter give her reminders for medications and appointments.  Independent and without difficulty  Ms. Rutledge reports most bills are on autopay. Until June 2024, Ms. Rutledge was responsible for the remaining bills that are paid manually. Her  now manages these bills because she was forgetting to pay them.     Physical Symptoms:    Tremor: no   Difficulty walking: no   Imbalance: no   Falls: no   Weakness/Numbness: no   Trouble with fine motor movements: no   Lightheadedness/Dizziness/Syncope: no   Urinary or Bowel Urgency/Incontinence: no   Sensory Sxs: Ms. Rutledge feels that her vision is blurry but states that her eye exams have been normal.   Pain: None reported  Physical Exercise Routine: None reported     Psychiatric/Neuropsychiatric Symptoms   Depression: Denied  Current Ideation, Intention, or Plan: Denied   Homicidal Ideation, Intention, or Plan: Denied  Hyun/Hypomania: Denied  Anxiety: Ms. Rutledge reports anxiety symptoms have been present since 2021 and has gradually worsened over time. She gets very overwhelmed about all the tasks she needs to complete which makes it difficult to begin tasks. She reports anxiety symptoms are present daily and that walking is helpful when she feels anxious.   Stress: Denied  Social Withdrawal: Denied  Neurovegetative Sxs:  Appetite: No changes reported   Sleep: Ms. Rutledge reports sleep is variable. Some days she sleeps less than 6 hours and other days she can sleep 10+ hours. She reports feeling like there are at least 2 days/week where she feels like  "she can sleep all day. She has difficulty falling asleep due to anxiety and worry. She reports within the last 2 years, she now needs noise in the background to fall asleep and typically puts the TV on with a timer. Additionally, she described having events where she starts crying and has shortness of breath. She states that they are like a panic attack but does not believe that is what's happening. She reports these events happen 1x every 2 weeks or 10-monico days and usually happen at nighttime.   Energy: Good  Hallucinations: Denied  Delusional/Paranoid Thinking: Denied  Obsessive/Compulsive Behaviors: Denied  Impulsivity/Compulsivity: Denied  Disinhibition: Denied  Irritability/Agitation: Ms. Rutledge has noticed she is more irritable and has a "shorter fuse." For example, she is more irritated/trigger by noises when before, she had no problems with noise given that she was a pre-.    Aggression: Denied  Apathy/Indifference: Denied  Other changes in personality: Denied    RELEVANT HISTORY  Psychiatric History   Prior Diagnoses: PNES  History of Trauma/Abuse: no   History of Suicide Attempts: no   Medication(s): no   Hospitalization(s): no  Psychotherapy/Counseling: no   Other: no     Substance Use History   Ms. Rutledge smokes 1 pack of cigarettes per week; 3 cigarettes/day. She denied use of alcohol or other substances.     Neurological History    Headaches/Migraines: She takes Maxalt as needed for headaches after seizures.  TBI: no   Seizures:   Seizure history per neurology notes:  AEDs Lamictal 300mg ER  Vimpat 200/200   Failed AEDs Fycompa (dizziness, agitated)  Keppra (agitated)  Depakote (not efficacious)   Compliance  Good    Age of onset  6 months old   Semiology  1st type: Per mother she turns her head and stares off. Her right hand starts tapping. Sometimes she starts tapping just before she starts staring off. Sees strobe type lights and "squiggles" in her vision prior to and after " the event. Can progress to convulsion.  - Aura: Hearing a buzzing or extremely loud train noise  - Triggers: car headlights at night  - Frequency: twice per month  - Duration: ~30 seconds     2nd type: electrical burning smell  - Triggers: car headlights at night  - Frequency: once per month  - Duration: ~30 seconds     3rd type: Crumbles to the ground. Has been associated with injury (tailbone fracture) although inconsistently.   - Frequency: once per week     Status epilepticus: none      Current Seizure Activity: Ms. Rutledge reported at today's appointment (2024) that she has 1-2 seizures per week that lasts about 30 seconds. During the seizures, she reports staring off, hearing a buzzing in her ear or train noise, smelling something burning, and getting squiggles in her vision.     Presurgical considerations discussed during neuropsychological evaluation on 2024:  Goals/Expectations: Ms. Rutledge expressed her primary goal for wanting surgery would be to stop seizures and any further cognitive decline.   Surgery Risks: Ms. Rutledge reports that she has not spoken with her neurosurgery team about the risks of surgery but acknowledged that she is aware surgery would include taking out a section of her brain and that she worries if her quality of life following surgery will improve.   Social Support: Ms. Rutledge reported her mother, , and daughter as her support system.   Follow-up Testing: Ms. Rutledge has an appointment scheduled for updated brain MRI scheduled for 2025.  Stroke: no   Tumor: no   Previous Episodes of Delirium: no   Movement Disorder: no   CNS Infection: no   Other: no     Family Neurological & Psychiatric History     Family history includes Cancer in her maternal grandfather; Diabetes in her paternal grandmother.  Neurologic: Negative for heritable risk factors.   Psychiatric: Negative for heritable risk factors.    Development  Education   Prenatal and   development: wnl  Developmental milestones: wnl  Language Acquisition: English first language  Level Attained: Bachelor's in childhood education   Learning/Attention/Behavior Difficulties: Ms. Rutledge reports longstanding memory problems and states that she studied more than her classmates and had to write everything down. Even with studying, she couldn't always recall the information she reviewed/studied.  Repeated Grade(s): no        Occupation  Social    Service: no   Occupational Status: She stopped working in 2019 when she was diagnosed with seizure disorder. She has been on disability for seizures since .  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 She attempted to return to her previous job 3 years ago but struggled with the work and the environment due to seizures.   Family Status:  6 years. She has 2 daughters.   Current Living Situation: Ms. Rutledge, her , and her daughters live together.   Support System: Good       Medical Status   Patient Active Problem List   Diagnosis    Transient neurological symptoms    Chronic migraine    Sleep disorder    Mesial temporal sclerosis    Migraine without aura and without status migrainosus, not intractable    Insomnia due to medical condition    Documented psychogenic non-epileptic events (PNEE)    Refractory epilepsy    Partial symptomatic epilepsy with complex partial seizures, intractable, without status epilepticus    Generalized anxiety disorder     Past Medical History:   Diagnosis Date    Seizures      Past Surgical History:   Procedure Laterality Date    APPENDECTOMY       SECTION      x 2    CHOLECYSTECTOMY      ENDOMETRIAL ABLATION      HYSTERECTOMY      SINUS SURGERY         Neurodiagnostics   EMU - 2024 - 2024  Hospital Course:   >: Home Lamictal held at admission and Vimpat decreased to 100 mg BID. Patient had witnessed non-epileptic event this morning where she was repeating a phrase;  " said this is one of her typical event types, there was no EEG correlate with this event. Plan for hold Vimpat beginning this evening and undergo sleep deprivation with meds overnight.   12/7>12/8: Successfully sleep deprived until 5:00AM. EEG normal without any further clinical events.   12/8>12/9: PB ~2026 for episode described as heart racing and "not feeling well" followed by crying, hyperventilating, tingling sensation in whole body, and "fidgeting" with clothing/blankets with impaired awareness with no EEG correlate. Neuropsychology evaluation today for nonepileptic events. EEG remains WNL. Continue with provoking measures to evaluate for concurrent epilepsy- repeat sleep deprivation tonight and activation medications 12/10 AM.  12/9>12/10: Successful sleep deprivation overnight, received activation medications this morning. EEG remains WNL. PB ~0808 for typical episode of staring associated with slurred speech, tingling sensation, and abdominal sensation with no EEG correlate. HV/PS today.  12/10>12/11: NAEON. EEG WNL throughout admission off AEDs with repeat provoking measures. Multiple typical events with no EEG correlate c/w conversion disorder. No evidence to suggest concurrent diagnosis of epilepsy. Discharged home in stable condition on resumed medications after discussion with patient's established neurologist, Dr. Elam. Recommend repeating MRI with Epilepsy Protocol to evaluate for MTS noted on previous MRI in 2020. Patient has scheduled follow up with Dr. Elam.    NM PET CT FDG Brain - 12/06/2024  Narrative & Impression  EXAMINATION:  NM PET CT FDG BRAIN     CLINICAL HISTORY:  Seizure disorder, surgical planning;  Localization-related (focal) (partial) symptomatic epilepsy and epileptic syndromes with complex partial seizures, intractable, without status epilepticus     TECHNIQUE:  After the administration of 8.6 mCi FDG, positron emission tomography images of the brain were obtained.  " Coronal, sagittal, and transaxial coronal and MIP images were displayed.  Noncontrast CT of the brain was obtained for attenuation correction.  Glycemia at the time of injection was 91 mg/dL.     Quantitative analysis was performed with [employer].     COMPARISON:  MRI 11/26/2024 and 05/28/2020.     FINDINGS:  Minimal asymmetric hypometabolism within the right hippocampal region as compared to the left.  Otherwise, there is symmetric metabolic activity within the cerebral and cerebellar hemispheres.     Impression:  Minimal asymmetric hypometabolism within the right hippocampal region as compared to the left.  Recommend correlation with history/exam and previous imaging.        Electronically signed by:Michael Vasquez  Date:                                            12/06/2024  Time:                                           11:23       MRI Brain Functional with a physician (NERY) - 11/26/2024  Narrative & Impression  EXAMINATION:  MRI BRAIN FUNCTIONAL WITH A PHYSICIAN (NERY)     CLINICAL HISTORY:  Localization-related (focal) (partial) symptomatic epilepsy and epileptic syndromes with complex partial seizures, intractable, without status epilepticus     TECHNIQUE:  BOLD functional MR imaging was performed on a 3 Kylie Siemens MR scanner.  Exam included the following paradigms:     Bilateral finger tapping     Word generation     Sentence completion     Verb generation     The patient was trained in the fMRI tasks prior to scanning.  Physician performed this training and was present throughout image acquisition. An Otto handedness survey was administered to the patient prior to the exam.     Volumetric T1 MPRAGE without contrast was used for localization.     COMPARISON:  Anatomic MR 05/28/2020     FINDINGS:  Patient is considered right handed.     Structural/anatomic images: No new focal parenchymal abnormalities are identified on this T1-weighted sequence.     Functional findings:     Bilateral finger tapping  performed as internal , demonstrates activations in the posterior cortical bank of the precentral gyri the expected location of the hand knob.     Multiple language paradigms performed.  Convergent activations in the left inferior frontal gyrus,, including the pars opercularis and pars triangularis.  This would be the expected location of the frontal speech area (traditional Broca's area), likely represents cortex involved with expressive language function.  Convergent activations at the junction of the left superior and middle temporal gyri posteriorly. This would be the expected location of temporal speech area (traditional Wernicke's area), likely represents receptive language function. Convergent activations in the posterior aspect of the left superior frontal gyrus, in keeping with language SMA or pre-SMA.  Additional activations throughout the left dorsolateral prefrontal cortex on multiple paradigms.  There are minimal right-sided activations on all paradigms.     Impression:  Findings compatible with left hemisphere language dominance with frontotemporal concordance.        Electronically signed by:Lokesh Zheng MD  Date:                                            11/26/2024  Time:                                           13:50       Results for orders placed or performed during the hospital encounter of 05/28/20   MRI Brain W WO Contrast    Narrative    EXAMINATION:  MRI BRAIN W WO CONTRAST    CLINICAL HISTORY:  Seizures new or progressive;. Unspecified convulsions    COMPARISON:  None.    FINDINGS:  No mass, mass effect or hemorrhage can be seen in the brain.  Decreased volume of the right hippocampus.  Increased intensity in FLAIR sequence in the right CNA.  Mild dilation of the few jaspreet horn of the right lateral ventricle.      Impression    Findings mostly consistent with right mesial temporal sclerosis with decreased volume of the CNA, loss of anatomical features and hyperintensity in  "FLAIR sequence indicating sclerotic changes.      Electronically signed by: Robert Whitmore  Date:    05/28/2020  Time:    13:07   Results for orders placed or performed in visit on 03/27/20   EEG    Narrative    Felipe Davalos MD     3/27/2020  8:12 PM  [facility]  [address] [place] [id number]     ROUTINE EEG REPORT     Patient Name: Lina Rutledge - [id number]  DATE OF PROCEDURE: 3/27/2020      HISTORY: 29 year-old woman with a history of seizures.    MEDICATIONS: lamictal, clonazepam, fycompa     TEST DESCRIPTION: This is a routine EEG recording with electrodes   placed according to the International 10/20 Electrode Placement   System.  Photic stimulation and hyperventilation were utilized as   activation procedures unless otherwise specified below. The   patient tolerated the entire procedure well.    EEG DESCRIPTION:  A normal awake background frequency was seen at 10 Hz, 10-30 uV,   bioccipital, symmetric, reactive to eye opening and closure.  No focal slowing was seen.  No epileptiform discharges or paroxysmal abnormalities were seen.  Drowsiness was demonstrated by slow rolling eye movements   followed by loss of background waking activities. No sleep   architecture was seen.  Hyperventilation was not performed. Photic stimulation was   performed at frequencies of 2 to 26 Hz but produced no notable   change in EEG.  Muscle, movement and electrode artifacts were occasionally noted.    EVENTS:  No electrographic seizures were seen.    EEG INTERPRETATION:  This is a normal EEG for age in the awake and drowsy states.    CLINICAL CORRELATION:  The absence of epileptiform abnormalities does not preclude a   clinical diagnosis of seizures. If indicated, a follow-up EEG, to   include sleep, may be clinically useful.       Pertinent Lab Work   No results found for: "AMBTJZXA62"  No results found for: "RPR"  No results found for: "FOLATE"  No results found for: "TSH", "U0VIKNV", " ""P2RXSVA", "THYROIDAB"  No results found for: "LABA1C", "HGBA1C"  No results found for: "HIV1X2", "[id number]"      Medications     Current Outpatient Medications:     diazePAM (VALTOCO) 15 mg/2 spray (7.5/0.1mL x 2) Spry, SPRAY 1 SPRAY IN THE NOSE AS NEEDED SEIZURE, Disp: 4 each, Rfl: 3    lacosamide (VIMPAT) 200 mg Tab tablet, Take 1 tablet (200 mg total) by mouth every 12 (twelve) hours., Disp: 60 tablet, Rfl: 3    lamotrigine XR (LAMICTAL XR) 300 mg XR tablet, Take 1 tablet (300 mg total) by mouth once daily., Disp: 30 tablet, Rfl: 6    rizatriptan (MAXALT-MLT) 5 MG disintegrating tablet, Take 1 tablet (5 mg total) by mouth as needed for Migraine. May repeat in 2 hours if needed, Disp: 30 tablet, Rfl: 2       OBJECTIVE:     MENTAL STATUS AND OBSERVATIONS:   Appearance: Casually dressed and adequate grooming/hygiene.   Alertness: Attentive and alert   Orientation:   O x 4    Gait:  Independent   Psychomotor:  WNL   Handedness:  Right   Vision & Hearing:  Adequate for session   Speech/language: Normal in rate, rhythm, tone, and volume. No significant word finding difficulty observed. Comprehension was normal.    Mood/Affect:  The patient's stated mood was "anxious." Affect was congruent with stated mood.    Interpersonal Behavior:  Rapport was quickly and easily established   Suicidality/Homicidality: Denied   Hallucinations/Delusions:  None evidenced   Thought Content: Logical   Thought Processes: Goal-directed   Insight & Judgment:  Appropriate   Participation in Interview:  Full + collateral   Testing Observations: Ms. Rutledge arrived on time for testing. She appeared anxious throughout testing. She was eager to finish tasks and often worked quickly. At times she was quick to give up on tasks, though this happened towards the end of the testing appointment. On tasks where she was required to draw, she was impulsive which impacted aspects of her drawings; however, she self-corrected when she perceived mistakes.  "     RESULTS     Tests Administered (manual norms used unless otherwise indicated): [employer] - Test of Premorbid Functioning (TOPF), TOMM, Wechsler Adult Intelligence Scale 4th Ed. (WAIS-IV),  Controlled Oral Word Association Test (COWAT; Nia norms), Semantic Fluency (Animals; [facility] norms), [place] Naming Test (BNT; [facility] norms), Auditory Naming Test, Jun Auditory Verbal Learning Test (RAVLT), Wechsler Memory Scale, 4th Ed. Logical Memory (WMS IV-LM), Wechsler Memory Scale, 4th Ed. Visual Reproduction (WMS IV-VR), Mind[employer] - Social Cognition, select subtest (Faces), Jun-Osterrieth Complex Figure Test (RCFT) Copy trial, [place] Card Sorting Test (WCST), Trail Making Test (TMT A/B; [facility] norms), Grooved Pegboard Test ([facility] norms), Quality of Life in Epilepsy - 31 (QOLIE-31), and Personality Assessment Inventory (OLIMPIA).    Score Label T-Score Standard Score Z-Score Scaled Score %ile Rank   Exceptionally High > 70 > 130 > 2.0  > 16 > 98   Above Average 64-69 120-129 1.4-1.9 15 91-97   High Average 57-63 110-119 0.7-1.3 12-14 75-90   Average 44-56  0.6 to -0.6 8-11 25-74   Low Average 37-43 80-89 -1.3 to -0.7 6-7 9-24   Below Average 30-36 70-79 -2.0 to -1.4 4-5 2-8   Exceptionally Low < 30 < 70  < -2.0 < 4 < 2       Performance Validity: Freestanding and embedded performance validity measures and observation of effort were suggestive of adequate engagement. The current results, therefore, are likely a valid reflection of Ms. Rutledge's current functioning.     PREMORBID FUNCTIONING Raw Score Type of Standardized Score Standardized Score Percentile/CP Descriptor   TOPF simple dem. eFSIQ -  73 Average   TOPF pred. eFSIQ - SS 99 47 Average   TOPF simple + pred. eFSIQ -  63 Average   INTELLECTUAL FUNCTIONING Raw Score Type of Standardized Score Standardized Score Percentile/CP Descriptor   WAIS-IV         VCI - SS 98 45 Average   ROSHNI -  63 Average   WMI -  SS 95 37 Average   PSI -  77 High Average   FSIQ -  55 Average   GAI - ss 101 53 Average   LANGUAGE FUNCTIONING Raw Score Type of Standardized Score Standardized Score Percentile/CP Descriptor   WAIS-IV Vocabulary 42 ss 11 63 Average   WAIS-IV Information 10 ss 8 25 Average   TOPF Word Reading 40 SS 97 42 Average   BNT-60 56 Tscore 42 21 Low Average   COWAT 30 Tscore 35 7 Below Average   Animal Naming 19 Tscore 40 16 Low Average   Auditory Naming RT 1.09 zscore -0.57 28 Low Average   Auditory Naming TC 50 zscore 0.67 75 High Average   Auditory Naming TOT 5 zscore -1.00 16 Low Average   VISUOSPATIAL FUNCTIONING Raw Score Type of Standardized Score Standardized Score Percentile/CP Descriptor   WAIS-IV Block Design 41 ss 10 50 Average   WAIS-IV Visual Puzzles 17 ss 12 75 High Average   RCFT Copy 31 Below Average - Qualitatively, 31/36 with demonstration of inattention to details on aspects of the drawing.   RCFT Time to Copy 226 - - >16 WNL   VR Copy 43 - - >75 WNL   LEARNING & MEMORY Raw Score Type of Standardized Score Standardized Score Percentile/CP Descriptor   RAVLT         Trial 1 6 Tscore 46 34 Average   Trial 2 11 Tscore 56 73 Average   Trial 3 12 Tscore 47 38 Average   Trial 4 12 Tscore 42 21 Low Average   Trial 5 14 Tscore 52 58 Average   Trials 1-5 Total 55 Tscore 49 46 Average   List B 7 Tscore 53 62 Average   Trial 6 (short delay) 9 Tscore 40 16 Low Average   30-min Recall (long delay) 12 Tscore 47 38 Average   Recognition Hits 15 Tscore - - -   Recognition FP Errors 0 Tscore - - -   Recognition Percentage Correct - Tscore 58 79 High Average   WMS-IV Subtests         LM I 23 ss 9 37 Average   LM II 19 ss 9 37 Average   LM Recognition 27 - - >75 High Average   VR I 35 ss 9 37 Average   VR II 22 ss 9 37 Average   VR II Recognition 5 - - 17-25 Low Average   ACS Social Cognition         Faces I 95 ss 9 37 Average   Faces II 30 ss 13 84 High Average   Faces Content 45 ss 11 63 Average   Faces  Spatial 44 ss 8 25 Average   ATTENTION/WORKING MEMORY Raw Score Type of Standardized Score Standardized Score Percentile/CP Descriptor   WAIS-IV Digit Span 25 ss 8 25 Average         DS Forward 11 ss 10 50 Average         DS Backward 6 ss 7 16 Low Average         DS Sequence 8 ss 9 37 Average         Longest Digit Forward 7 - - - -         Longest Digit Backward 3 - - - -         Longest Digit Sequence 6 - - - -   WAIS-IV Arithmetic 14 ss 10 50 Average   MENTAL PROCESSING SPEED Raw Score Type of Standardized Score Standardized Score Percentile/CP Descriptor   WAIS-IV Symbol Search 42 ss 14 91 Above Average   WAIS-IV Coding 71 ss 10 50 Average   TMT A  31 Tscore 39 14 Low Average   TMT A Total Err. 0 - - - -   EXECUTIVE FUNCTIONING Raw Score Type of Standardized Score Standardized Score Percentile/CP Descriptor   TMT B 117 Tscore 26 1 Exceptionally Low    TMT B Total Err. 2 - - - -   TMT B Seq Err. 2 - - - -   WCST-128         Total Correct   70 - -   Total Errors 10  55 Average   Perseverative Resp. 7 SS 97 42 Average   Perseverative Err. 6 SS 98 45 Average   Nonperseverative Err. 4  61 Average   Concept. Level Response 67  53 Average   Categories Completed 6 - - >16 WNL   FMS 1 - - >16 WNL   Learning to Learn 0 - - >16 WNL   WAIS-IV Similarities 26 ss 10 50 Average   WAIS-IV Matrix Reasoning 20 ss 11 63 Average   FRONTOMOTOR  Raw Score Type of Standardized Score Standardized Score Percentile/CP Descriptor   GPT DH 58 Tscore 51 54 Average   GPT NDH 66 Tscore 48 42 Average   MOOD & PERSONALITY Raw Score Type of Standardized Score Standardized Score Percentile/CP Descriptor   OLIMPIA    - -   ICN 2 Tscore 40 - -   INF 4 Tscore 55 - -   NIM 2 Tscore 51 - -   PIM 13 Tscore 45 - -   RICHARD 34 Tscore 73 - -   ANX 27 Tscore 60 - -   HUNG 33 Tscore 66 - -   DEP 25 Tscore 61 - -   MAN 20 Tscore 47 - -   PAR 23 Tscore 55 - -   SCZ 20 Tscore 58 - -   BOR 27 Tscore 59 - -   ANT 9 Tscore 45 - -   ALC 0 Tscore 41 - -    DRG 0 Tscore 42 - -   AGG 13 Tscore 48 - -   SUZIE 0 Tscore 43 - -   STR 4 Tscore 46 - -   NON 0 Tscore 37 - -   RXR 16 Tscore 55 - -   DOM 19 Tscore 47 - -   WRM 23 Tscore 49 - -   QOLIE-31         Seizure Worry 17.34 Tscore 34 6 Moderate   Overall Quality of Life 77.5 Tscore 56 71 WNL   Emotional Well-Being 76 Tscore 55 68 WNL   Energy/Fatigue 40 Tscore 43 23 WNL   Cognitive 33.05 Tscore 38 12 Moderate   Medication Effects 75.00 Tscore 56 74 WNL   Social Function 34 Tscore 38 11 Moderate   Overall Score - Tscore 40 16 WNL   ss = scaled score (mean = 10, SD = 3); SS = standard score (mean = 100, SD = 15); Tscore mean = 50, SD = 10; zscore (mean = 0.00, SD = 1); WNL = Within Normal Limits; BNL = Below Normal Limits     Mood: Ms. Rutledge completed the OLIMPIA: a standardized inventory designed to assess a broad range of personality and psychological functions.    Validity: Ms. Rutledge's responses were internally consistent, free of idiosyncratic responding, and free of evidence for significant impression management. As such, her resultant profile is deemed valid for interpretation.     Clinical: Ms. Rutledge's responses produced clinically significant elevation on scales that assess somatic symptoms. Ms. Rutledge's considerable elevation (T = 73) was underscored by clinically significant elevation on sub-scales of health concerns and sub-clinical elevation on conversion. These elevations suggest that she sees herself as significantly disabled by her physical symptoms; she may be preoccupied with her health and medical problems, and has many health complaints some of which may be caused or exacerbated significantly by psychological processes. Ultimately, regardless of underlying medical concerns, her distress arising from concerns about health is itself a clinically significant source of distress and may also cause or exacerbate her physical health concerns.     Interpersonal: Ms. Rutledge interpersonal style is best  characterized as one of autonomy and balance. She reports having a good support system that she can rely on when needed, with a stress level comparable to that of other adults.     Critical Items: Ms. Rutledge did not endorse critical items concerning for increased risk of suicide.      Billing Documentation     Time on review of neuropsychological test data and relevant records, data interpretation, and writing/ documentation: 120 minutes; 88032 & 42962 (x1).  Psychometrist time spent in the administration and scoring of 2 or more neuropsychological tests 358 minutes; 77989 & 70251 (x11).     Referral Diagnoses:  G40.219 (ICD-10-CM) - Partial symptomatic epilepsy with complex partial seizures, intractable, without status epilepticus

## 2025-01-24 ENCOUNTER — PATIENT MESSAGE (OUTPATIENT)
Dept: NEUROLOGY | Facility: CLINIC | Age: 35
End: 2025-01-24
Payer: MEDICARE

## 2025-01-28 NOTE — PROGRESS NOTES
Patient uploaded an EEG report from August of 2019 which reads abnormal EEG due to the presence of infrequent left temporal spikes and waves.

## 2025-02-04 PROBLEM — F41.1 GENERALIZED ANXIETY DISORDER: Status: ACTIVE | Noted: 2025-02-04

## 2025-02-05 ENCOUNTER — OFFICE VISIT (OUTPATIENT)
Dept: NEUROLOGY | Facility: CLINIC | Age: 35
End: 2025-02-05
Payer: MEDICARE

## 2025-02-05 DIAGNOSIS — G40.219 PARTIAL SYMPTOMATIC EPILEPSY WITH COMPLEX PARTIAL SEIZURES, INTRACTABLE, WITHOUT STATUS EPILEPTICUS: ICD-10-CM

## 2025-02-05 DIAGNOSIS — F44.9 CONVERSION DISORDER: Primary | ICD-10-CM

## 2025-02-05 DIAGNOSIS — F41.1 GENERALIZED ANXIETY DISORDER: ICD-10-CM

## 2025-02-05 PROCEDURE — 99499 UNLISTED E&M SERVICE: CPT | Mod: S$PBB,,, | Performed by: STUDENT IN AN ORGANIZED HEALTH CARE EDUCATION/TRAINING PROGRAM

## 2025-02-05 PROCEDURE — 96132 NRPSYC TST EVAL PHYS/QHP 1ST: CPT | Mod: ,,, | Performed by: STUDENT IN AN ORGANIZED HEALTH CARE EDUCATION/TRAINING PROGRAM

## 2025-02-05 NOTE — PROGRESS NOTES
Ms. Rutledge attended an in-person feedback session to discuss the results from her recent neuropsychological testing (see report dated 01/15/2025). We discussed her test results, diagnoses, and my recommendations. Ms. Rutledge voiced her understanding of the information provided, and voiced her intention to follow through on the recommendations provided. All questions were answered to her satisfaction and Ms. Rutledge was provided with a copy of her report. She was encouraged to contact our office with any future questions or concerns.     A majority of time was spent providing education and discussing the possibility of co-occurring psychogenic non-epileptic events (PNEE) and epilepsy, and treatment considerations. A referral was made for treatment of PNEE.     Billing Documentation     Time on review of neuropsychological test data and relevant records, data interpretation, providing feedback about these results, and writing/ documentation: 50 minutes; [id number]    Aryan Jackson, Ph.D.  Postdoctoral Fellow in Clinical Neuropsychology  [facility] - Department of Neurology     Destin Wadsworth, Ph.D.  Licensed Clinical Neuropsychologist   [facility] - Department of Neurology

## 2025-02-19 ENCOUNTER — PATIENT MESSAGE (OUTPATIENT)
Dept: NEUROLOGY | Facility: CLINIC | Age: 35
End: 2025-02-19
Payer: MEDICARE

## 2025-02-25 ENCOUNTER — TELEPHONE (OUTPATIENT)
Dept: NEUROLOGY | Facility: CLINIC | Age: 35
End: 2025-02-25
Payer: MEDICARE

## 2025-02-25 NOTE — TELEPHONE ENCOUNTER
LCSW placed call to patient following up on a referral.  LCSW introduced self and noted I work with the neurologists and epileptologists at [facility]. I support patients with PNEE and epilepsy.      She was reluctant to hear about PNEE and alleges she was told she does not have epilepsy. LCSW reported he would get more information and that the therapy visits can be anything she wishes to discuss including appointments, diagnoses and other stressors.

## 2025-03-10 ENCOUNTER — SOCIAL WORK (OUTPATIENT)
Dept: NEUROLOGY | Facility: CLINIC | Age: 35
End: 2025-03-10
Payer: MEDICARE

## 2025-03-10 NOTE — PROGRESS NOTES
LCSW placed second call to patient.  LCSW asked patient if she had received additional information from MD. She reports she had not.     LCSW asked if patient would recount brief hx of seizures. She Had one EEG at age 5 years old and thought everyone     Officially dx in December 2018 following GTC  Lives with Children and fiance.   Always done these things as long as she can remember    Its never bothered me, I just go on with life  Never effected me and emotions    MD allegedly told her the EEG picks up 95% of seizures and she believes she is part of the 5%.     LCSW acknowledged and expressed empathy.  Reviewed stressors and stressors can be major or minor.  Patient noted some stressors cannot be avoided and LCSW noted that is very insightful as the literature shows there are avoidable and unavoidable life stressors.     LCSW provided examples and asked patient to consider.  Patient appreciative of LCSW's time.  No further support requested and no virtual visits requested.

## 2025-04-17 NOTE — PROGRESS NOTES
"[facility]  Clinic Note    Date of Service: 4/17/2025  Patient seen at the request of: No ref. provider found    Telemedicine Visit    The patient location is: [place]    Visit type: audiovisual    Face to Face time with patient: 17 minutes  25 minutes of total time spent on the encounter, which includes face to face time and non-face to face time preparing to see the patient (eg, review of tests), Obtaining and/or reviewing separately obtained history, Documenting clinical information in the electronic or other health record, Independently interpreting results (not separately reported) and communicating results to the patient/family/caregiver, or Care coordination (not separately reported).       Each patient to whom he or she provides medical services by telemedicine is:  (1) informed of the relationship between the physician and patient and the respective role of any other health care provider with respect to management of the patient; and (2) notified that he or she may decline to receive medical services by telemedicine and may withdraw from such care at any time.        Reason for Consultation  Seizures    Assessment:  Lina Rutledge is a 34 y.o. 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  Patient presents with very frequent focal seizures that have been associated with progression to convulsions.  When discussed in surgical conference, neuroradiology did not see evidence of mesial temporal sclerosis.    Semiologies:  At least three different seizure semiologies noted.      First semiology is reported to be a visual strobing or sometimes squiggles in her vision.  These symptoms are sometimes preceded by a loud auditory aura.  Sometimes progress to convulsion.    Patient also describes an isolated burning smell and a third phenomenon in which she "crumbles to the ground".  Once, this third semiology led to an injury as she fell down the stairs.  Query " "as to whether it is not true at[place] as the patient does not always injure herself during these events.    Seizures are usually associated with severe headache after their offset.    Imaging:  CT and MRI brain were unremarkable in 2018.   MRI brain in 2020 significant for right mesial temporal sclerosis.    Repeat MRI brain epilepsy protocol 2025: No focal epileptogenic lesion identified     PET: Minimal asymmetric hypometabolism within the right hippocampal region   fMRI: left hemispheric dominant    EEG:  During the onset of seizures, pt was admitted to [place] a few times and EEG on 12/7/2018 showed infrequent sharply contoured waves with phase reversal. EEG on 12/11/2018 showed generalized slowing consistent with encephalopathy vs medication effect (while on Vimpat).     24 hour : normal   Per tech note "Pt says she was awaken by a bad dream, patient breathing heavily and recounting the events of the dream.".There is no clear epileptic electrographic correlate to this event     Routine EEG 12/2024: normal    EMU 12/2024 (5 days): normal background  - Repeating a phrase typical - no EEG correlate  - Heart racing, crying, hyperventilating, tingling sensation in whole body and fidgeting with clothing/blankets, impaired awareness - no EEG correlate  - Staring episode with slurred speech, tingling, and abdominal sensation typical - no EEG correlate  - Patient reports that the intense fear was captured in the EMU (associated with hand picking and repeating).  Patient "vaguely" remembers this captured event.    - Burning smell was not captured in the EMU    Other workup:  Neuropsychology: pending    Discussion:  All of the above workup and history has been discussed with the patient.  Burning smell was not captured in the recent EMU, however, impaired consciousness associated with picking/fidgeting and a sensation of fear was captured in the EMU and without EEG correlate.  This degree of impaired consciousness " would be expected to show abnormalities on the EEG if epileptic in etiology.    We discussed the possibility of a mixed picture of epileptic and nonepileptic seizures.  It is theoretically possible that the patient is having focal, scalp-negative seizures that can provoke a nonepileptic progression of semiology, or that a history of focal, scalp-negative seizures has, in part, led to the nonepileptic seizures that were captured during the EMU admission.    I discussed with the patient that invasive monitoring can not be pursued if the patient does not have definitive evidence of epileptic seizures on EEG.  There is too much risk for harm to the patient without assurance of benefit.  I discussed with her the merits of attempting to treat the nonepileptic seizures in order to improve quality of life, as well as unmask underlying epileptic seizures.    Query any contribution of migraine/acephalgic migraine as the patient reports visual strobing or sometimes squiggles in her vision +/- a following headache.  Some of the semiologies may be due to network phenomena outside of epileptic seizure and may respond to CGRP inhibitors or other related medications.    Patient would like to take some time to lizet over these developments.  Patient was discussed with epilepsy surgical conference.        Treatment:    Current medications:    - Lamictal 300mg ER   - Vimpat 200/200  Past medications:    - Fycompa (dizziness, agitated)   - Keppra (agitated)   - Depakote (not efficacious)    Plan:    - ambulatory EEG     - continue lamictal 300mg ER  - Vimpat 200mg/200mg    - increase to Maxalt 10mg as needed for headaches  - neuropsychological testing      - RTC in 3 months    SEIZURE/EVENT PRECAUTIONS INCLUDE:  NO DRIVING until approximately 3-6 months have passed WITHOUT a seizure.   If you have a seizure, you will need to wait another 3 months to be eligible to drive again.  Avoid bathing or swimming alone or unsupervised.  Avoid  "cooking over large ranges or open flames.    Avoid climbing up heights unsupervised.  Avoid operating heavy machinery.     SEIZURE SAFETY:  When an epileptic seizure occurs, the following should be done to prevent injuries:  Do not hold or tie the person down.  Do not place anything in the person's mouth or try to force the teeth apart. The person is not in danger of swallowing his tongue.  Do not pour any liquid into the persons mouth or offer food or medicines until he is fully awake.  If possible, turn the person on his side during the seizure.  Place something soft under the person's head, loosen tight clothing, and clear the area of sharp or hard objects.  Stay with the person until the seizure ends. Let the person rest until he is fully awake.  Use a watch to time how long the seizure lasts.   Watch the type of movement and position of the person's head or eyes during the seizure.      A dictation device was used to produce this document. Use of such devices sometimes results in grammatical errors or replacement of words that sound similarly.     Signed:    Prashanth Elam MD  Neurology/Epilepsy  04/17/2025 7:53 AM        Interval Events:  - patient reports that she is still having episodes of buzzing and strange smell twice per week                HPI:  Lina Rutledge is a 34 y.o. female with   Past Medical History:   Diagnosis Date    Seizures      MRI shows right MTS.    Patient reports one EMU visit in [place] with no seizure captured.      Significant decline in short term memory noted over the last 6 years.    First seizure: 6 months old    Semiology:   1st type:   Per mother she turns her head and stares off. Her right hand starts tapping. Sometimes she starts tapping just before she starts staring off. Sees strobe type lights and "squiggles" in her vision prior to and after the event. Can progress to convulsion.  - Aura: Hearing a buzzing or extremely loud train noise  - Triggers: car headlights " "at night  - Frequency: twice per month  - Duration: ~30 seconds    2nd type:   electrical burning smell  - Triggers: car headlights at night  - Frequency: once per month  - Duration: ~30 seconds    3rd type:   Crumbles to the ground  Has been associated with injury (tailbone fracture)  - Frequency: once per week    Status epilepticus: none    Seizure Risk Factors:   Birth history: 37 weeks   Developmental history: none  Febrile seizure: yes at 6 months   CNS infection: none  Head trauma: none  Family history: none    Previous Anti-Epileptic Medication:    Current medications:    - Lamictal 300mg ER   - Vimpat 200/200  Past medications:    - Fycompa (dizziness, agitated)   - Keppra (agitated)   - Depakote (not efficacious)  Driving: none      This is the extent of the patient's complaints at this time.    No results found for: "TSH", "V12", "BCWIFVFS31", "HGBA1C"      Review of Systems:  ROS negative unless noted in HPI    Past Surgical History:  Past Surgical History:   Procedure Laterality Date    APPENDECTOMY       SECTION      x 2    CHOLECYSTECTOMY      ENDOMETRIAL ABLATION      HYSTERECTOMY      SINUS SURGERY         Family History:  Family History   Problem Relation Name Age of Onset    Cancer Maternal Grandfather      Diabetes Paternal Grandmother         Social History:  Social History     Tobacco Use    Smoking status: Every Day     Current packs/day: 0.25     Types: Cigarettes    Smokeless tobacco: Current   Substance Use Topics    Alcohol use: Not Currently    Drug use: Never       Allergies:  Cefprozil    Outpatient Medications:  Prior to Admission medications    Medication Sig Start Date End Date Taking? Authorizing Provider   diazePAM (VALTOCO) 15 mg/2 spray (7.5/0.1mL x 2) Spry SPRAY 1 SPRAY IN THE NOSE AS NEEDED SEIZURE 23   Joyce Costa APRN   lacosamide (VIMPAT) 200 mg Tab tablet Take 1 tablet (200 mg total) by mouth every 12 (twelve) hours. 24   Joyce Costa APRN "   lamotrigine XR (LAMICTAL XR) 300 mg XR tablet Take 1 tablet (300 mg total) by mouth once daily. 7/31/24 2/26/25  Joyce Costa APRN       Physical exam:    General:   in no distress, well-nourished, well-developed, appears stated age.  Head/Neck:   Normocephalic,atraumatic  Pulm:  Non-labored breathing     Mental Status: Alert and oriented to person, time, place, situation. Speech spontaneous and fluent without paraphasias; no dysarthria  CN: III, IV, VI: EOM intact without nystagmus or diplopia.   VII: Facial movement full and symmetric.   VIII: Hearing grossly normal to conversation.  IX, X: Palate midline with symmetric elevation.    XII: Tongue midline without fasciculations.  Motor: Normal bulk throughout all four extremities.   RUE: antigravity  LUE: antigravity   RLE: antigravity  LLE: antigravity  No tremors       Imaging:  All pertinent imaging was personally reviewed.    On my personal review:   Agree with MRI brain read of right MTS      Results for orders placed during the hospital encounter of 05/28/20    MRI Brain W WO Contrast    Narrative  EXAMINATION:  MRI BRAIN W WO CONTRAST    CLINICAL HISTORY:  Seizures new or progressive;. Unspecified convulsions    COMPARISON:  None.    FINDINGS:  No mass, mass effect or hemorrhage can be seen in the brain.  Decreased volume of the right hippocampus.  Increased intensity in FLAIR sequence in the right CNA.  Mild dilation of the few jaspreet horn of the right lateral ventricle.    Impression  Findings mostly consistent with right mesial temporal sclerosis with decreased volume of the CNA, loss of anatomical features and hyperintensity in FLAIR sequence indicating sclerotic changes.      Electronically signed by: Robert Whitmore  Date:    05/28/2020  Time:    13:07

## 2025-04-21 ENCOUNTER — OFFICE VISIT (OUTPATIENT)
Dept: NEUROLOGY | Facility: CLINIC | Age: 35
End: 2025-04-21
Payer: MEDICARE

## 2025-04-21 DIAGNOSIS — G40.909 SEIZURE DISORDER: Primary | ICD-10-CM

## 2025-04-21 DIAGNOSIS — G43.009 MIGRAINE WITHOUT AURA AND WITHOUT STATUS MIGRAINOSUS, NOT INTRACTABLE: ICD-10-CM

## 2025-04-21 PROCEDURE — 98006 SYNCH AUDIO-VIDEO EST MOD 30: CPT | Mod: 95,,, | Performed by: STUDENT IN AN ORGANIZED HEALTH CARE EDUCATION/TRAINING PROGRAM

## 2025-04-21 RX ORDER — LACOSAMIDE 200 MG/1
200 TABLET ORAL EVERY 12 HOURS
Qty: 180 TABLET | Refills: 3 | Status: SHIPPED | OUTPATIENT
Start: 2025-04-21

## 2025-04-21 RX ORDER — RIZATRIPTAN BENZOATE 10 MG/1
10 TABLET, ORALLY DISINTEGRATING ORAL
Qty: 30 TABLET | Refills: 2 | Status: SHIPPED | OUTPATIENT
Start: 2025-04-21 | End: 2025-05-21

## 2025-04-21 RX ORDER — LAMOTRIGINE 300 MG/1
300 TABLET, EXTENDED RELEASE ORAL DAILY
Qty: 90 TABLET | Refills: 3 | Status: SHIPPED | OUTPATIENT
Start: 2025-04-21 | End: 2026-04-16